# Patient Record
Sex: FEMALE | Race: WHITE | NOT HISPANIC OR LATINO | URBAN - METROPOLITAN AREA
[De-identification: names, ages, dates, MRNs, and addresses within clinical notes are randomized per-mention and may not be internally consistent; named-entity substitution may affect disease eponyms.]

---

## 2017-01-12 ENCOUNTER — OUTPATIENT (OUTPATIENT)
Dept: OUTPATIENT SERVICES | Facility: HOSPITAL | Age: 77
LOS: 1 days | Discharge: HOME | End: 2017-01-12
Payer: COMMERCIAL

## 2017-01-12 DIAGNOSIS — Z95.1 PRESENCE OF AORTOCORONARY BYPASS GRAFT: Chronic | ICD-10-CM

## 2017-01-13 DIAGNOSIS — F31.10 BIPOLAR DISORDER, CURRENT EPISODE MANIC WITHOUT PSYCHOTIC FEATURES, UNSPECIFIED: ICD-10-CM

## 2017-02-02 LAB
ALBUMIN SERPL ELPH-MCNC: 3.6 G/DL — SIGNIFICANT CHANGE UP (ref 3.3–5)
ALP SERPL-CCNC: 47 U/L — SIGNIFICANT CHANGE UP (ref 40–120)
ALT FLD-CCNC: 7 U/L — SIGNIFICANT CHANGE UP (ref 4–33)
AST SERPL-CCNC: 11 U/L — SIGNIFICANT CHANGE UP (ref 4–32)
BASOPHILS # BLD AUTO: 0.13 K/UL — SIGNIFICANT CHANGE UP (ref 0–0.2)
BASOPHILS NFR BLD AUTO: 1.8 % — SIGNIFICANT CHANGE UP (ref 0–2)
BILIRUB SERPL-MCNC: < 0.2 MG/DL — LOW (ref 0.2–1.2)
BUN SERPL-MCNC: 50 MG/DL — HIGH (ref 7–23)
CALCIUM SERPL-MCNC: 9.8 MG/DL — SIGNIFICANT CHANGE UP (ref 8.4–10.5)
CHLORIDE SERPL-SCNC: 105 MMOL/L — SIGNIFICANT CHANGE UP (ref 98–107)
CO2 SERPL-SCNC: 22 MMOL/L — SIGNIFICANT CHANGE UP (ref 22–31)
CREAT SERPL-MCNC: 1.54 MG/DL — HIGH (ref 0.5–1.3)
EOSINOPHIL # BLD AUTO: 0.41 K/UL — SIGNIFICANT CHANGE UP (ref 0–0.5)
EOSINOPHIL NFR BLD AUTO: 5.7 % — SIGNIFICANT CHANGE UP (ref 0–6)
GLUCOSE SERPL-MCNC: 103 MG/DL — HIGH (ref 70–99)
HBA1C BLD-MCNC: 5.3 % — SIGNIFICANT CHANGE UP (ref 4–5.6)
HCT VFR BLD CALC: 28.4 % — LOW (ref 34.5–45)
HGB BLD-MCNC: 9 G/DL — LOW (ref 11.5–15.5)
IMM GRANULOCYTES NFR BLD AUTO: 0.4 % — SIGNIFICANT CHANGE UP (ref 0–1.5)
LYMPHOCYTES # BLD AUTO: 1.63 K/UL — SIGNIFICANT CHANGE UP (ref 1–3.3)
LYMPHOCYTES # BLD AUTO: 22.5 % — SIGNIFICANT CHANGE UP (ref 13–44)
MCHC RBC-ENTMCNC: 30.8 PG — SIGNIFICANT CHANGE UP (ref 27–34)
MCHC RBC-ENTMCNC: 31.7 % — LOW (ref 32–36)
MCV RBC AUTO: 97.3 FL — SIGNIFICANT CHANGE UP (ref 80–100)
MONOCYTES # BLD AUTO: 0.49 K/UL — SIGNIFICANT CHANGE UP (ref 0–0.9)
MONOCYTES NFR BLD AUTO: 6.8 % — SIGNIFICANT CHANGE UP (ref 2–14)
NEUTROPHILS # BLD AUTO: 4.54 K/UL — SIGNIFICANT CHANGE UP (ref 1.8–7.4)
NEUTROPHILS NFR BLD AUTO: 62.8 % — SIGNIFICANT CHANGE UP (ref 43–77)
PLATELET # BLD AUTO: 295 K/UL — SIGNIFICANT CHANGE UP (ref 150–400)
PMV BLD: 11.4 FL — SIGNIFICANT CHANGE UP (ref 7–13)
POTASSIUM SERPL-MCNC: 4.5 MMOL/L — SIGNIFICANT CHANGE UP (ref 3.5–5.3)
POTASSIUM SERPL-SCNC: 4.5 MMOL/L — SIGNIFICANT CHANGE UP (ref 3.5–5.3)
PROT SERPL-MCNC: 6.1 G/DL — SIGNIFICANT CHANGE UP (ref 6–8.3)
RBC # BLD: 2.92 M/UL — LOW (ref 3.8–5.2)
RBC # FLD: 14.1 % — SIGNIFICANT CHANGE UP (ref 10.3–14.5)
SODIUM SERPL-SCNC: 142 MMOL/L — SIGNIFICANT CHANGE UP (ref 135–145)
TSH SERPL-MCNC: 3.36 UIU/ML — SIGNIFICANT CHANGE UP (ref 0.27–4.2)
VALPROATE SERPL-MCNC: 35.7 UG/ML — LOW (ref 50–100)
VIT B12 SERPL-MCNC: 1107 PG/ML — HIGH (ref 200–900)
WBC # BLD: 7.23 K/UL — SIGNIFICANT CHANGE UP (ref 3.8–10.5)
WBC # FLD AUTO: 7.23 K/UL — SIGNIFICANT CHANGE UP (ref 3.8–10.5)

## 2017-06-02 LAB
ALBUMIN SERPL ELPH-MCNC: 3.7 G/DL — SIGNIFICANT CHANGE UP (ref 3.3–5)
ALP SERPL-CCNC: 43 U/L — SIGNIFICANT CHANGE UP (ref 40–120)
ALT FLD-CCNC: 16 U/L — SIGNIFICANT CHANGE UP (ref 4–33)
AST SERPL-CCNC: 21 U/L — SIGNIFICANT CHANGE UP (ref 4–32)
BASOPHILS # BLD AUTO: 0.06 K/UL — SIGNIFICANT CHANGE UP (ref 0–0.2)
BASOPHILS NFR BLD AUTO: 0.8 % — SIGNIFICANT CHANGE UP (ref 0–2)
BILIRUB SERPL-MCNC: 0.2 MG/DL — SIGNIFICANT CHANGE UP (ref 0.2–1.2)
BUN SERPL-MCNC: 54 MG/DL — HIGH (ref 7–23)
CALCIUM SERPL-MCNC: 9.6 MG/DL — SIGNIFICANT CHANGE UP (ref 8.4–10.5)
CHLORIDE SERPL-SCNC: 107 MMOL/L — SIGNIFICANT CHANGE UP (ref 98–107)
CO2 SERPL-SCNC: 25 MMOL/L — SIGNIFICANT CHANGE UP (ref 22–31)
CREAT SERPL-MCNC: 2.03 MG/DL — HIGH (ref 0.5–1.3)
EOSINOPHIL # BLD AUTO: 0.3 K/UL — SIGNIFICANT CHANGE UP (ref 0–0.5)
EOSINOPHIL NFR BLD AUTO: 4.1 % — SIGNIFICANT CHANGE UP (ref 0–6)
GLUCOSE SERPL-MCNC: 168 MG/DL — HIGH (ref 70–99)
HBA1C BLD-MCNC: 5.2 % — SIGNIFICANT CHANGE UP (ref 4–5.6)
HCT VFR BLD CALC: 29.4 % — LOW (ref 34.5–45)
HGB BLD-MCNC: 9.3 G/DL — LOW (ref 11.5–15.5)
IMM GRANULOCYTES NFR BLD AUTO: 0.4 % — SIGNIFICANT CHANGE UP (ref 0–1.5)
LYMPHOCYTES # BLD AUTO: 1.54 K/UL — SIGNIFICANT CHANGE UP (ref 1–3.3)
LYMPHOCYTES # BLD AUTO: 20.9 % — SIGNIFICANT CHANGE UP (ref 13–44)
MCHC RBC-ENTMCNC: 31.6 % — LOW (ref 32–36)
MCHC RBC-ENTMCNC: 31.6 PG — SIGNIFICANT CHANGE UP (ref 27–34)
MCV RBC AUTO: 100 FL — SIGNIFICANT CHANGE UP (ref 80–100)
MONOCYTES # BLD AUTO: 0.45 K/UL — SIGNIFICANT CHANGE UP (ref 0–0.9)
MONOCYTES NFR BLD AUTO: 6.1 % — SIGNIFICANT CHANGE UP (ref 2–14)
NEUTROPHILS # BLD AUTO: 4.99 K/UL — SIGNIFICANT CHANGE UP (ref 1.8–7.4)
NEUTROPHILS NFR BLD AUTO: 67.7 % — SIGNIFICANT CHANGE UP (ref 43–77)
PLATELET # BLD AUTO: 231 K/UL — SIGNIFICANT CHANGE UP (ref 150–400)
PMV BLD: 11.1 FL — SIGNIFICANT CHANGE UP (ref 7–13)
POTASSIUM SERPL-MCNC: 4.9 MMOL/L — SIGNIFICANT CHANGE UP (ref 3.5–5.3)
POTASSIUM SERPL-SCNC: 4.9 MMOL/L — SIGNIFICANT CHANGE UP (ref 3.5–5.3)
PROT SERPL-MCNC: 6.2 G/DL — SIGNIFICANT CHANGE UP (ref 6–8.3)
RBC # BLD: 2.94 M/UL — LOW (ref 3.8–5.2)
RBC # FLD: 14.1 % — SIGNIFICANT CHANGE UP (ref 10.3–14.5)
SODIUM SERPL-SCNC: 146 MMOL/L — HIGH (ref 135–145)
VALPROATE SERPL-MCNC: 56.4 UG/ML — SIGNIFICANT CHANGE UP (ref 50–100)
WBC # BLD: 7.37 K/UL — SIGNIFICANT CHANGE UP (ref 3.8–10.5)
WBC # FLD AUTO: 7.37 K/UL — SIGNIFICANT CHANGE UP (ref 3.8–10.5)

## 2018-04-26 ENCOUNTER — INPATIENT (INPATIENT)
Facility: HOSPITAL | Age: 78
LOS: 53 days | Discharge: NURSING, REHAB OR LONG-TERM CARE | End: 2018-06-19
Attending: PSYCHIATRY & NEUROLOGY | Admitting: PSYCHIATRY & NEUROLOGY
Payer: COMMERCIAL

## 2018-04-26 VITALS — WEIGHT: 177.03 LBS | TEMPERATURE: 98 F | HEIGHT: 68 IN

## 2018-04-26 DIAGNOSIS — Z95.1 PRESENCE OF AORTOCORONARY BYPASS GRAFT: Chronic | ICD-10-CM

## 2018-04-26 DIAGNOSIS — F31.10 BIPOLAR DISORDER, CURRENT EPISODE MANIC WITHOUT PSYCHOTIC FEATURES, UNSPECIFIED: ICD-10-CM

## 2018-04-26 LAB
ALBUMIN SERPL ELPH-MCNC: 3.3 G/DL — SIGNIFICANT CHANGE UP (ref 3.3–5)
ALP SERPL-CCNC: 50 U/L — SIGNIFICANT CHANGE UP (ref 40–120)
ALT FLD-CCNC: 10 U/L — SIGNIFICANT CHANGE UP (ref 4–33)
AST SERPL-CCNC: 26 U/L — SIGNIFICANT CHANGE UP (ref 4–32)
BASOPHILS # BLD AUTO: 0.09 K/UL — SIGNIFICANT CHANGE UP (ref 0–0.2)
BASOPHILS NFR BLD AUTO: 1 % — SIGNIFICANT CHANGE UP (ref 0–2)
BILIRUB SERPL-MCNC: < 0.2 MG/DL — LOW (ref 0.2–1.2)
BUN SERPL-MCNC: 39 MG/DL — HIGH (ref 7–23)
CALCIUM SERPL-MCNC: 10.2 MG/DL — SIGNIFICANT CHANGE UP (ref 8.4–10.5)
CHLORIDE SERPL-SCNC: 103 MMOL/L — SIGNIFICANT CHANGE UP (ref 98–107)
CO2 SERPL-SCNC: 27 MMOL/L — SIGNIFICANT CHANGE UP (ref 22–31)
CREAT SERPL-MCNC: 1.93 MG/DL — HIGH (ref 0.5–1.3)
EOSINOPHIL # BLD AUTO: 0.14 K/UL — SIGNIFICANT CHANGE UP (ref 0–0.5)
EOSINOPHIL NFR BLD AUTO: 1.5 % — SIGNIFICANT CHANGE UP (ref 0–6)
FOLATE SERPL-MCNC: > 20 NG/ML — HIGH (ref 4.7–20)
GLUCOSE SERPL-MCNC: 137 MG/DL — HIGH (ref 70–99)
HBA1C BLD-MCNC: 4.5 % — SIGNIFICANT CHANGE UP (ref 4–5.6)
HCT VFR BLD CALC: 34.1 % — LOW (ref 34.5–45)
HGB BLD-MCNC: 10.6 G/DL — LOW (ref 11.5–15.5)
IMM GRANULOCYTES # BLD AUTO: 0.04 # — SIGNIFICANT CHANGE UP
IMM GRANULOCYTES NFR BLD AUTO: 0.4 % — SIGNIFICANT CHANGE UP (ref 0–1.5)
LYMPHOCYTES # BLD AUTO: 1.26 K/UL — SIGNIFICANT CHANGE UP (ref 1–3.3)
LYMPHOCYTES # BLD AUTO: 13.8 % — SIGNIFICANT CHANGE UP (ref 13–44)
MCHC RBC-ENTMCNC: 31.1 % — LOW (ref 32–36)
MCHC RBC-ENTMCNC: 32.9 PG — SIGNIFICANT CHANGE UP (ref 27–34)
MCV RBC AUTO: 105.9 FL — HIGH (ref 80–100)
MONOCYTES # BLD AUTO: 0.77 K/UL — SIGNIFICANT CHANGE UP (ref 0–0.9)
MONOCYTES NFR BLD AUTO: 8.4 % — SIGNIFICANT CHANGE UP (ref 2–14)
NEUTROPHILS # BLD AUTO: 6.86 K/UL — SIGNIFICANT CHANGE UP (ref 1.8–7.4)
NEUTROPHILS NFR BLD AUTO: 74.9 % — SIGNIFICANT CHANGE UP (ref 43–77)
NRBC # FLD: 0 — SIGNIFICANT CHANGE UP
PLATELET # BLD AUTO: 215 K/UL — SIGNIFICANT CHANGE UP (ref 150–400)
PMV BLD: 12.1 FL — SIGNIFICANT CHANGE UP (ref 7–13)
POTASSIUM SERPL-MCNC: 4.8 MMOL/L — SIGNIFICANT CHANGE UP (ref 3.5–5.3)
POTASSIUM SERPL-SCNC: 4.8 MMOL/L — SIGNIFICANT CHANGE UP (ref 3.5–5.3)
PROT SERPL-MCNC: 6 G/DL — SIGNIFICANT CHANGE UP (ref 6–8.3)
RBC # BLD: 3.22 M/UL — LOW (ref 3.8–5.2)
RBC # FLD: 13.7 % — SIGNIFICANT CHANGE UP (ref 10.3–14.5)
SODIUM SERPL-SCNC: 142 MMOL/L — SIGNIFICANT CHANGE UP (ref 135–145)
TSH SERPL-MCNC: 4.5 UIU/ML — HIGH (ref 0.27–4.2)
VIT B12 SERPL-MCNC: 1848 PG/ML — HIGH (ref 200–900)
WBC # BLD: 9.16 K/UL — SIGNIFICANT CHANGE UP (ref 3.8–10.5)
WBC # FLD AUTO: 9.16 K/UL — SIGNIFICANT CHANGE UP (ref 3.8–10.5)

## 2018-04-26 PROCEDURE — 93010 ELECTROCARDIOGRAM REPORT: CPT

## 2018-04-26 RX ORDER — DIVALPROEX SODIUM 500 MG/1
500 TABLET, DELAYED RELEASE ORAL AT BEDTIME
Qty: 0 | Refills: 0 | Status: DISCONTINUED | OUTPATIENT
Start: 2018-04-26 | End: 2018-05-01

## 2018-04-26 RX ORDER — METOPROLOL TARTRATE 50 MG
100 TABLET ORAL
Qty: 0 | Refills: 0 | Status: DISCONTINUED | OUTPATIENT
Start: 2018-04-26 | End: 2018-05-01

## 2018-04-26 RX ORDER — AMLODIPINE BESYLATE 2.5 MG/1
5 TABLET ORAL DAILY
Qty: 0 | Refills: 0 | Status: DISCONTINUED | OUTPATIENT
Start: 2018-04-26 | End: 2018-05-01

## 2018-04-26 RX ORDER — ATORVASTATIN CALCIUM 80 MG/1
20 TABLET, FILM COATED ORAL AT BEDTIME
Qty: 0 | Refills: 0 | Status: DISCONTINUED | OUTPATIENT
Start: 2018-04-26 | End: 2018-06-19

## 2018-04-26 RX ORDER — HYDRALAZINE HCL 50 MG
50 TABLET ORAL THREE TIMES A DAY
Qty: 0 | Refills: 0 | Status: DISCONTINUED | OUTPATIENT
Start: 2018-04-26 | End: 2018-05-01

## 2018-04-26 RX ORDER — LEVOTHYROXINE SODIUM 125 MCG
75 TABLET ORAL
Qty: 0 | Refills: 0 | Status: DISCONTINUED | OUTPATIENT
Start: 2018-04-26 | End: 2018-06-19

## 2018-04-26 RX ORDER — FUROSEMIDE 40 MG
40 TABLET ORAL DAILY
Qty: 0 | Refills: 0 | Status: DISCONTINUED | OUTPATIENT
Start: 2018-04-26 | End: 2018-05-23

## 2018-04-26 RX ORDER — APIXABAN 2.5 MG/1
5 TABLET, FILM COATED ORAL EVERY 12 HOURS
Qty: 0 | Refills: 0 | Status: DISCONTINUED | OUTPATIENT
Start: 2018-04-26 | End: 2018-05-01

## 2018-04-26 RX ORDER — ESCITALOPRAM OXALATE 10 MG/1
10 TABLET, FILM COATED ORAL DAILY
Qty: 0 | Refills: 0 | Status: DISCONTINUED | OUTPATIENT
Start: 2018-04-26 | End: 2018-06-19

## 2018-04-26 RX ADMIN — DIVALPROEX SODIUM 500 MILLIGRAM(S): 500 TABLET, DELAYED RELEASE ORAL at 22:31

## 2018-04-26 RX ADMIN — ATORVASTATIN CALCIUM 20 MILLIGRAM(S): 80 TABLET, FILM COATED ORAL at 22:31

## 2018-04-26 RX ADMIN — Medication 100 MILLIGRAM(S): at 22:32

## 2018-04-26 RX ADMIN — Medication 50 MILLIGRAM(S): at 22:32

## 2018-04-26 RX ADMIN — APIXABAN 5 MILLIGRAM(S): 2.5 TABLET, FILM COATED ORAL at 22:31

## 2018-04-26 NOTE — CHART NOTE - NSCHARTNOTEFT_GEN_A_CORE
Screening Medical Evaluation  Patient Admitted from: [facility] admitting diagnosis: Bipolar affective disorder, current episode manic without psychotic symptoms      PAST MEDICAL & SURGICAL HISTORY:  Uterine prolapse  Renal insufficiency  Coronary artery disease  Hypothyroid  Hypertension  Bipolar disorder  S/P CABG x 3        Allergies    No Known Allergies    Intolerances    aripiprazole (Unknown)  quetiapine (Unknown)      Social History:     FAMILY HISTORY:  No pertinent family history in first degree relatives      MEDICATIONS  (STANDING):  amLODIPine   Tablet 5 milliGRAM(s) Oral daily  apixaban 5 milliGRAM(s) Oral every 12 hours  atorvastatin 20 milliGRAM(s) Oral at bedtime  diVALproex  milliGRAM(s) Oral at bedtime  escitalopram 10 milliGRAM(s) Oral daily  furosemide    Tablet 40 milliGRAM(s) Oral daily  hydrALAZINE 50 milliGRAM(s) Oral three times a day  levothyroxine 75 MICROGram(s) Oral <User Schedule>  metoprolol tartrate 100 milliGRAM(s) Oral two times a day    MEDICATIONS  (PRN):      Vital Signs Last 24 Hrs  T(C): 36.8 (26 Apr 2018 15:30), Max: 36.8 (26 Apr 2018 15:30)  T(F): 98.3 (26 Apr 2018 15:30), Max: 98.3 (26 Apr 2018 15:30)  HR: 58 (26 Apr 2018 11:13) (58 - 58)  BP: --174/65  BP(mean): --  RR: --  SpO2: --  CAPILLARY BLOOD GLUCOSE            PHYSICAL EXAM:  GENERAL: NAD, well-developed  HEAD:  Atraumatic, Normocephalic  EYES: EOMI, PERRLA, conjunctiva and sclera clear  NECK: Supple, No JVD  CHEST/LUNG: Clear to auscultation bilaterally; No wheeze  HEART: Regular rate and rhythm; No murmurs, rubs, or gallops  ABDOMEN: Soft, Nontender, Nondistended; Bowel sounds present  EXTREMITIES:  2+ Peripheral Pulses, No clubbing, cyanosis, or edema  PSYCH: AAOx3  NEUROLOGY: non-focal  SKIN:    LABS:                        10.6   9.16  )-----------( 215      ( 26 Apr 2018 13:25 )             34.1     04-26    142  |  103  |  39<H>  ----------------------------<  137<H>  4.8   |  27  |  1.93<H>    Ca    10.2      26 Apr 2018 13:25    TPro  6.0  /  Alb  3.3  /  TBili  < 0.2<L>  /  DBili  x   /  AST  26  /  ALT  10  /  AlkPhos  50  04-26              RADIOLOGY & ADDITIONAL TESTS:    Assessment and Plan: 76 yo F with PMH of atrial fibrillation, HTN, hyperlipidemia, hypothyroid is admitted to [facility] with a primary psychiatric diagnosis of Bipolar affective disorder, current episode manic without psychotic symptoms. Pt currently has a low grade fever. The pt currently denies having any medical complaints such as chest pain, sob, abdominal pain, n/v/d/c, or any problems with urination or bowel movements. The rest of her screening physical is unremarkable.     1.Bipolar affective disorder, current episode manic without psychotic symptoms-Plan: continue with meds as per primary psychiatric team  2. HTN-Plan: continue with Amlodipine, furosemide, hydralazine   3. Hyperlipidemia-Plan: continue with atorvastatin   4. Hypothyroid-Plan: continue with levothyroxine  5. A.fib-Plan: continue with apixaben Screening Medical Evaluation  Patient Admitted from: [facility] admitting diagnosis: Bipolar affective disorder, current episode manic without psychotic symptoms      PAST MEDICAL & SURGICAL HISTORY:  Uterine prolapse  Renal insufficiency  Coronary artery disease  Hypothyroid  Hypertension  Bipolar disorder  S/P CABG x 3        Allergies    No Known Allergies    Intolerances    aripiprazole (Unknown)  quetiapine (Unknown)      Social History:     FAMILY HISTORY:  No pertinent family history in first degree relatives      MEDICATIONS  (STANDING):  amLODIPine   Tablet 5 milliGRAM(s) Oral daily  apixaban 5 milliGRAM(s) Oral every 12 hours  atorvastatin 20 milliGRAM(s) Oral at bedtime  diVALproex  milliGRAM(s) Oral at bedtime  escitalopram 10 milliGRAM(s) Oral daily  furosemide    Tablet 40 milliGRAM(s) Oral daily  hydrALAZINE 50 milliGRAM(s) Oral three times a day  levothyroxine 75 MICROGram(s) Oral <User Schedule>  metoprolol tartrate 100 milliGRAM(s) Oral two times a day    MEDICATIONS  (PRN):      Vital Signs Last 24 Hrs  T(C): 36.8 (26 Apr 2018 15:30), Max: 36.8 (26 Apr 2018 15:30)  T(F): 98.3 (26 Apr 2018 15:30), Max: 98.3 (26 Apr 2018 15:30)  HR: 58 (26 Apr 2018 11:13) (58 - 58)  BP: --174/65  BP(mean): --  RR: --  SpO2: --  CAPILLARY BLOOD GLUCOSE            PHYSICAL EXAM:  GENERAL: NAD, well-developed  HEAD:  Atraumatic, Normocephalic  EYES: EOMI, PERRLA, conjunctiva and sclera clear  NECK: Supple, No JVD  CHEST/LUNG: Clear to auscultation bilaterally; No wheeze  HEART: Regular rate and rhythm; No murmurs, rubs, or gallops  ABDOMEN: Soft, Nontender, Nondistended; Bowel sounds present  EXTREMITIES:  2+ Peripheral Pulses, No clubbing, cyanosis, or edema  PSYCH: AAOx3  NEUROLOGY: non-focal  SKIN: In tact    LABS:                        10.6   9.16  )-----------( 215      ( 26 Apr 2018 13:25 )             34.1     04-26    142  |  103  |  39<H>  ----------------------------<  137<H>  4.8   |  27  |  1.93<H>    Ca    10.2      26 Apr 2018 13:25    TPro  6.0  /  Alb  3.3  /  TBili  < 0.2<L>  /  DBili  x   /  AST  26  /  ALT  10  /  AlkPhos  50  04-26              RADIOLOGY & ADDITIONAL TESTS:    Assessment and Plan: 76 yo F with PMH of atrial fibrillation, HTN, hyperlipidemia, hypothyroid is admitted to [facility] with a primary psychiatric diagnosis of Bipolar affective disorder, current episode manic without psychotic symptoms. Pts BP during screening was 183/65, however pt was given BP meds immediately. Repeat BP is Pt currently has a low grade fever. Pt denies any headaches, or blurry vision. The pt currently denies having any medical complaints such as chest pain, sob, abdominal pain, n/v/d/c, or any problems with urination or bowel movements. The rest of her screening physical is unremarkable.     1.Bipolar affective disorder, current episode manic without psychotic symptoms-Plan: continue with meds as per primary psychiatric team  2. HTN-Plan: continue with Amlodipine, furosemide, hydralazine   3. Hyperlipidemia-Plan: continue with atorvastatin   4. Hypothyroid-Plan: continue with levothyroxine  5. A.fib-Plan: continue with apixaban

## 2018-04-27 DIAGNOSIS — I10 ESSENTIAL (PRIMARY) HYPERTENSION: ICD-10-CM

## 2018-04-27 DIAGNOSIS — D64.9 ANEMIA, UNSPECIFIED: ICD-10-CM

## 2018-04-27 DIAGNOSIS — E03.9 HYPOTHYROIDISM, UNSPECIFIED: ICD-10-CM

## 2018-04-27 DIAGNOSIS — E78.5 HYPERLIPIDEMIA, UNSPECIFIED: ICD-10-CM

## 2018-04-27 LAB
CHOLEST SERPL-MCNC: 161 MG/DL — SIGNIFICANT CHANGE UP (ref 120–199)
HDLC SERPL-MCNC: 42 MG/DL — LOW (ref 45–65)
LIPID PNL WITH DIRECT LDL SERPL: 90 MG/DL — SIGNIFICANT CHANGE UP
TRIGL SERPL-MCNC: 170 MG/DL — HIGH (ref 10–149)
VALPROATE SERPL-MCNC: 32.1 UG/ML — LOW (ref 50–100)

## 2018-04-27 PROCEDURE — 99232 SBSQ HOSP IP/OBS MODERATE 35: CPT

## 2018-04-27 RX ADMIN — ESCITALOPRAM OXALATE 10 MILLIGRAM(S): 10 TABLET, FILM COATED ORAL at 08:30

## 2018-04-27 RX ADMIN — Medication 75 MICROGRAM(S): at 06:35

## 2018-04-27 RX ADMIN — Medication 100 MILLIGRAM(S): at 22:46

## 2018-04-27 RX ADMIN — Medication 100 MILLIGRAM(S): at 08:30

## 2018-04-27 RX ADMIN — Medication 50 MILLIGRAM(S): at 12:35

## 2018-04-27 RX ADMIN — Medication 40 MILLIGRAM(S): at 08:30

## 2018-04-27 RX ADMIN — Medication 50 MILLIGRAM(S): at 08:30

## 2018-04-27 RX ADMIN — DIVALPROEX SODIUM 500 MILLIGRAM(S): 500 TABLET, DELAYED RELEASE ORAL at 22:45

## 2018-04-27 RX ADMIN — Medication 50 MILLIGRAM(S): at 22:45

## 2018-04-27 RX ADMIN — APIXABAN 5 MILLIGRAM(S): 2.5 TABLET, FILM COATED ORAL at 22:45

## 2018-04-27 RX ADMIN — APIXABAN 5 MILLIGRAM(S): 2.5 TABLET, FILM COATED ORAL at 08:30

## 2018-04-27 RX ADMIN — ATORVASTATIN CALCIUM 20 MILLIGRAM(S): 80 TABLET, FILM COATED ORAL at 22:45

## 2018-04-27 RX ADMIN — AMLODIPINE BESYLATE 5 MILLIGRAM(S): 2.5 TABLET ORAL at 08:30

## 2018-04-28 PROCEDURE — 99232 SBSQ HOSP IP/OBS MODERATE 35: CPT

## 2018-04-28 RX ADMIN — Medication 50 MILLIGRAM(S): at 09:44

## 2018-04-28 RX ADMIN — ATORVASTATIN CALCIUM 20 MILLIGRAM(S): 80 TABLET, FILM COATED ORAL at 22:39

## 2018-04-28 RX ADMIN — Medication 75 MICROGRAM(S): at 06:25

## 2018-04-28 RX ADMIN — AMLODIPINE BESYLATE 5 MILLIGRAM(S): 2.5 TABLET ORAL at 09:44

## 2018-04-28 RX ADMIN — Medication 100 MILLIGRAM(S): at 22:40

## 2018-04-28 RX ADMIN — APIXABAN 5 MILLIGRAM(S): 2.5 TABLET, FILM COATED ORAL at 22:39

## 2018-04-28 RX ADMIN — DIVALPROEX SODIUM 500 MILLIGRAM(S): 500 TABLET, DELAYED RELEASE ORAL at 22:40

## 2018-04-28 RX ADMIN — APIXABAN 5 MILLIGRAM(S): 2.5 TABLET, FILM COATED ORAL at 09:44

## 2018-04-28 RX ADMIN — Medication 50 MILLIGRAM(S): at 22:40

## 2018-04-28 RX ADMIN — Medication 100 MILLIGRAM(S): at 09:44

## 2018-04-28 RX ADMIN — Medication 40 MILLIGRAM(S): at 09:44

## 2018-04-28 RX ADMIN — ESCITALOPRAM OXALATE 10 MILLIGRAM(S): 10 TABLET, FILM COATED ORAL at 09:44

## 2018-04-28 RX ADMIN — Medication 50 MILLIGRAM(S): at 12:43

## 2018-04-29 PROCEDURE — 99232 SBSQ HOSP IP/OBS MODERATE 35: CPT

## 2018-04-29 RX ADMIN — APIXABAN 5 MILLIGRAM(S): 2.5 TABLET, FILM COATED ORAL at 21:44

## 2018-04-29 RX ADMIN — Medication 40 MILLIGRAM(S): at 09:18

## 2018-04-29 RX ADMIN — DIVALPROEX SODIUM 500 MILLIGRAM(S): 500 TABLET, DELAYED RELEASE ORAL at 21:44

## 2018-04-29 RX ADMIN — APIXABAN 5 MILLIGRAM(S): 2.5 TABLET, FILM COATED ORAL at 09:18

## 2018-04-29 RX ADMIN — AMLODIPINE BESYLATE 5 MILLIGRAM(S): 2.5 TABLET ORAL at 09:18

## 2018-04-29 RX ADMIN — Medication 100 MILLIGRAM(S): at 09:18

## 2018-04-29 RX ADMIN — ESCITALOPRAM OXALATE 10 MILLIGRAM(S): 10 TABLET, FILM COATED ORAL at 09:18

## 2018-04-29 RX ADMIN — ATORVASTATIN CALCIUM 20 MILLIGRAM(S): 80 TABLET, FILM COATED ORAL at 21:44

## 2018-04-29 RX ADMIN — Medication 50 MILLIGRAM(S): at 12:38

## 2018-04-29 RX ADMIN — Medication 100 MILLIGRAM(S): at 21:44

## 2018-04-29 RX ADMIN — Medication 50 MILLIGRAM(S): at 09:18

## 2018-04-29 RX ADMIN — Medication 50 MILLIGRAM(S): at 21:44

## 2018-04-29 RX ADMIN — Medication 75 MICROGRAM(S): at 06:44

## 2018-04-30 LAB
BUN SERPL-MCNC: 46 MG/DL — HIGH (ref 7–23)
CALCIUM SERPL-MCNC: 9.5 MG/DL — SIGNIFICANT CHANGE UP (ref 8.4–10.5)
CHLORIDE SERPL-SCNC: 106 MMOL/L — SIGNIFICANT CHANGE UP (ref 98–107)
CO2 SERPL-SCNC: 26 MMOL/L — SIGNIFICANT CHANGE UP (ref 22–31)
CREAT SERPL-MCNC: 1.97 MG/DL — HIGH (ref 0.5–1.3)
GLUCOSE SERPL-MCNC: 93 MG/DL — SIGNIFICANT CHANGE UP (ref 70–99)
POTASSIUM SERPL-MCNC: 3.9 MMOL/L — SIGNIFICANT CHANGE UP (ref 3.5–5.3)
POTASSIUM SERPL-SCNC: 3.9 MMOL/L — SIGNIFICANT CHANGE UP (ref 3.5–5.3)
SODIUM SERPL-SCNC: 147 MMOL/L — HIGH (ref 135–145)

## 2018-04-30 PROCEDURE — 99223 1ST HOSP IP/OBS HIGH 75: CPT

## 2018-04-30 PROCEDURE — 99232 SBSQ HOSP IP/OBS MODERATE 35: CPT

## 2018-04-30 RX ADMIN — ESCITALOPRAM OXALATE 10 MILLIGRAM(S): 10 TABLET, FILM COATED ORAL at 09:04

## 2018-04-30 RX ADMIN — Medication 40 MILLIGRAM(S): at 09:05

## 2018-04-30 RX ADMIN — DIVALPROEX SODIUM 500 MILLIGRAM(S): 500 TABLET, DELAYED RELEASE ORAL at 21:17

## 2018-04-30 RX ADMIN — ATORVASTATIN CALCIUM 20 MILLIGRAM(S): 80 TABLET, FILM COATED ORAL at 21:17

## 2018-04-30 RX ADMIN — APIXABAN 5 MILLIGRAM(S): 2.5 TABLET, FILM COATED ORAL at 21:17

## 2018-04-30 RX ADMIN — Medication 50 MILLIGRAM(S): at 14:01

## 2018-04-30 RX ADMIN — Medication 100 MILLIGRAM(S): at 09:05

## 2018-04-30 RX ADMIN — Medication 50 MILLIGRAM(S): at 21:17

## 2018-04-30 RX ADMIN — Medication 50 MILLIGRAM(S): at 09:05

## 2018-04-30 RX ADMIN — APIXABAN 5 MILLIGRAM(S): 2.5 TABLET, FILM COATED ORAL at 09:04

## 2018-04-30 RX ADMIN — AMLODIPINE BESYLATE 5 MILLIGRAM(S): 2.5 TABLET ORAL at 09:04

## 2018-04-30 RX ADMIN — Medication 75 MICROGRAM(S): at 06:29

## 2018-04-30 RX ADMIN — Medication 100 MILLIGRAM(S): at 21:17

## 2018-04-30 NOTE — CONSULT NOTE ADULT - SUBJECTIVE AND OBJECTIVE BOX
HPI: Pt is 77F admitted to [facility] 4/26/18 for management of depression, ECT is planned.  SInce patient had a CVA in 2016 she has been progressively more depressed and less mobile.  She was able to ambulate with a walker and one person assist after the CVA.  She was evaluated by her cardiologisty Dr Llamas in Sept 2017 (see below).  In November 2017 she was hospitalized for a UTI then spent one month in a subacute rehab.  She is currently wheelchair-dependent.  She is alert and oriented but unable to provide medical history.  She denies CP/dyspnea/paliptations/headaches.    PAST MEDICAL & SURGICAL HISTORY:  Chronic diastolic congestive heart failure: TTE 9/18/17 Dr Llamas/Adela: normal LVEF 50-55%, grade 1 diastolic dysfunction, borderline LVH  CVA (cerebral vascular accident): 1987, 2016  Paroxysmal atrial fibrillation: questionable history after CVA 2016; started on eliquis  Stage 4 chronic kidney disease  Uterine prolapse: pessary 2015  Coronary artery disease: CABG 2003  Hypothyroid  Hypertension  Bipolar disorder  S/P CABG x 3: 2003      Review of Systems:   CONSTITUTIONAL: No fever, weight loss, or fatigue  EYES: No eye pain, visual disturbances, or discharge  ENMT:  No difficulty hearing, tinnitus, vertigo; No sinus or throat pain  NECK: No pain or stiffness  RESPIRATORY: No cough, wheezing, chills or hemoptysis; No shortness of breath  CARDIOVASCULAR: No chest pain, palpitations, dizziness, or leg swelling  GASTROINTESTINAL: No abdominal or epigastric pain. No nausea, vomiting, or hematemesis; No diarrhea or constipation. No melena or hematochezia.  GENITOURINARY: No dysuria, frequency, hematuria, or incontinence  NEUROLOGICAL: No headaches, numbness, or tremors.  Unable to ambulate.  SKIN: No itching, burning, rashes, or lesions   LYMPH NODES: No enlarged glands  ENDOCRINE: No heat or cold intolerance; No hair loss  MUSCULOSKELETAL: No joint pain or swelling; No muscle, back, or extremity pain  HEME/LYMPH: No easy bruising, or bleeding gums  ALLERY AND IMMUNOLOGIC: No hives or eczema    Allergies    No Known Allergies    Intolerances    aripiprazole (Unknown)  quetiapine (Unknown)      Social History: never smoker, no IDU/etoh    FAMILY HISTORY:  No pertinent family history in first degree relatives      MEDICATIONS  (STANDING):  amLODIPine   Tablet 5 milliGRAM(s) Oral daily  apixaban 5 milliGRAM(s) Oral every 12 hours  atorvastatin 20 milliGRAM(s) Oral at bedtime  diVALproex  milliGRAM(s) Oral at bedtime  escitalopram 10 milliGRAM(s) Oral daily  furosemide    Tablet 40 milliGRAM(s) Oral daily  hydrALAZINE 50 milliGRAM(s) Oral three times a day  levothyroxine 75 MICROGram(s) Oral <User Schedule>  metoprolol tartrate 100 milliGRAM(s) Oral two times a day    MEDICATIONS  (PRN):      Vital Signs Last 24 Hrs  T(C): 36.8 (30 Apr 2018 08:06), Max: 36.9 (29 Apr 2018 15:35)  T(F): 98.3 (30 Apr 2018 08:06), Max: 98.4 (29 Apr 2018 15:35)  HR: 64 (30 Apr 2018 09:54) (64 - 64)  BP: 143/70  BP(mean): --  RR: 17 (29 Apr 2018 20:32) (17 - 17)  SpO2: --  CAPILLARY BLOOD GLUCOSE            PHYSICAL EXAM:  GENERAL: NAD, well-developed  HEAD:  Atraumatic, Normocephalic  EYES: EOMI, PERRLA, conjunctiva and sclera clear  NECK: Supple, No JVD  CHEST/LUNG: Clear to auscultation bilaterally; No wheeze  HEART: Regular rate and rhythm; No murmurs, rubs, or gallops  ABDOMEN: Soft, Nontender, Nondistended; Bowel sounds present  EXTREMITIES:  2+ Peripheral Pulses, No clubbing, cyanosis, or edema  PSYCH: AAOx3  NEUROLOGY: LE weakness  SKIN: No rashes or lesions    LABS:    04-30    147<H>  |  106  |  46<H>  ----------------------------<  93  3.9   |  26  |  1.97<H>    Ca    9.5      30 Apr 2018 08:48        EKG(personally reviewed): SB 51 Qs in III, F, no change vs prior EKG    RADIOLOGY & ADDITIONAL TESTS:    TTE 9/18/17: normal LVEF 50-55%, grade 1 diastolic dysfunction, borderline LVH    Consultant(s) Notes Reviewed:  Cardiology Dr Llamas 9/18/17    Care Discussed with Consultants/Other Providers: Dr Amador

## 2018-04-30 NOTE — CONSULT NOTE ADULT - ASSESSMENT
77F with depression, ECT is planned. Pre-procedure evaluation:   ECT specific risk assessment: pt without history of increased ICP, aneurysm, active pulmonary disease, valvular disease, CAD, cerebral vascular disease.     Cardiovascular risk assessment: The patient's CAD has been stably medically managed since CABG and her mild diastolic CHF is stable, she appears euvolemic.  She is at low risk for cardiovascular complications from the low risk procedure, ECT. She is medically optimized to proceed with ECT without further testing.    Anesthesia specific concerns :    History of adverse reaction to anesthesia previously: none  Esophageal Assessment: normal  Known Spine issues: none   CKD stage 4, stable    Further risk reduction will involve medical management of other comorbid conditions:     CAD: continue statin as per Dr Llamas    PAfib: In NSR, remains on AC with renally-dosed eliquis    HTN: Continue amlodipine, metoprolol, lasix, hydralazine.      Chronic diastolic CHF: euvolemic, continue lasix    CKD4: stable, avoid nephrotoxins    Hypothyroidism: Continue synthroid 75. Borderline TSH, repeat.     Hx CVA: Physical therapy to maximize mobility

## 2018-05-01 PROCEDURE — 99232 SBSQ HOSP IP/OBS MODERATE 35: CPT

## 2018-05-01 RX ORDER — HYDRALAZINE HCL 50 MG
50 TABLET ORAL
Qty: 0 | Refills: 0 | Status: DISCONTINUED | OUTPATIENT
Start: 2018-05-01 | End: 2018-06-19

## 2018-05-01 RX ORDER — APIXABAN 2.5 MG/1
5 TABLET, FILM COATED ORAL
Qty: 0 | Refills: 0 | Status: DISCONTINUED | OUTPATIENT
Start: 2018-05-01 | End: 2018-06-19

## 2018-05-01 RX ORDER — METOPROLOL TARTRATE 50 MG
100 TABLET ORAL
Qty: 0 | Refills: 0 | Status: DISCONTINUED | OUTPATIENT
Start: 2018-05-01 | End: 2018-06-19

## 2018-05-01 RX ORDER — AMLODIPINE BESYLATE 2.5 MG/1
5 TABLET ORAL
Qty: 0 | Refills: 0 | Status: DISCONTINUED | OUTPATIENT
Start: 2018-05-02 | End: 2018-06-11

## 2018-05-01 RX ADMIN — ATORVASTATIN CALCIUM 20 MILLIGRAM(S): 80 TABLET, FILM COATED ORAL at 21:57

## 2018-05-01 RX ADMIN — ESCITALOPRAM OXALATE 10 MILLIGRAM(S): 10 TABLET, FILM COATED ORAL at 08:55

## 2018-05-01 RX ADMIN — Medication 100 MILLIGRAM(S): at 08:56

## 2018-05-01 RX ADMIN — Medication 100 MILLIGRAM(S): at 21:57

## 2018-05-01 RX ADMIN — Medication 50 MILLIGRAM(S): at 21:57

## 2018-05-01 RX ADMIN — APIXABAN 5 MILLIGRAM(S): 2.5 TABLET, FILM COATED ORAL at 08:55

## 2018-05-01 RX ADMIN — Medication 50 MILLIGRAM(S): at 08:56

## 2018-05-01 RX ADMIN — AMLODIPINE BESYLATE 5 MILLIGRAM(S): 2.5 TABLET ORAL at 08:55

## 2018-05-01 RX ADMIN — Medication 40 MILLIGRAM(S): at 08:55

## 2018-05-01 RX ADMIN — APIXABAN 5 MILLIGRAM(S): 2.5 TABLET, FILM COATED ORAL at 21:57

## 2018-05-01 RX ADMIN — Medication 50 MILLIGRAM(S): at 14:02

## 2018-05-01 RX ADMIN — Medication 75 MICROGRAM(S): at 07:27

## 2018-05-02 PROCEDURE — 90870 ELECTROCONVULSIVE THERAPY: CPT

## 2018-05-02 PROCEDURE — 99232 SBSQ HOSP IP/OBS MODERATE 35: CPT | Mod: 25

## 2018-05-02 RX ADMIN — Medication 75 MICROGRAM(S): at 06:30

## 2018-05-02 RX ADMIN — Medication 100 MILLIGRAM(S): at 21:57

## 2018-05-02 RX ADMIN — AMLODIPINE BESYLATE 5 MILLIGRAM(S): 2.5 TABLET ORAL at 06:30

## 2018-05-02 RX ADMIN — Medication 50 MILLIGRAM(S): at 06:30

## 2018-05-02 RX ADMIN — APIXABAN 5 MILLIGRAM(S): 2.5 TABLET, FILM COATED ORAL at 06:30

## 2018-05-02 RX ADMIN — ESCITALOPRAM OXALATE 10 MILLIGRAM(S): 10 TABLET, FILM COATED ORAL at 14:10

## 2018-05-02 RX ADMIN — Medication 100 MILLIGRAM(S): at 06:30

## 2018-05-02 RX ADMIN — APIXABAN 5 MILLIGRAM(S): 2.5 TABLET, FILM COATED ORAL at 21:57

## 2018-05-02 RX ADMIN — Medication 50 MILLIGRAM(S): at 14:10

## 2018-05-02 RX ADMIN — ATORVASTATIN CALCIUM 20 MILLIGRAM(S): 80 TABLET, FILM COATED ORAL at 21:57

## 2018-05-02 RX ADMIN — Medication 40 MILLIGRAM(S): at 14:10

## 2018-05-02 RX ADMIN — Medication 50 MILLIGRAM(S): at 21:57

## 2018-05-03 PROCEDURE — 99232 SBSQ HOSP IP/OBS MODERATE 35: CPT

## 2018-05-03 RX ADMIN — APIXABAN 5 MILLIGRAM(S): 2.5 TABLET, FILM COATED ORAL at 22:09

## 2018-05-03 RX ADMIN — AMLODIPINE BESYLATE 5 MILLIGRAM(S): 2.5 TABLET ORAL at 06:45

## 2018-05-03 RX ADMIN — Medication 75 MICROGRAM(S): at 06:45

## 2018-05-03 RX ADMIN — ATORVASTATIN CALCIUM 20 MILLIGRAM(S): 80 TABLET, FILM COATED ORAL at 22:09

## 2018-05-03 RX ADMIN — ESCITALOPRAM OXALATE 10 MILLIGRAM(S): 10 TABLET, FILM COATED ORAL at 10:17

## 2018-05-03 RX ADMIN — APIXABAN 5 MILLIGRAM(S): 2.5 TABLET, FILM COATED ORAL at 06:45

## 2018-05-03 RX ADMIN — Medication 40 MILLIGRAM(S): at 10:17

## 2018-05-03 RX ADMIN — Medication 50 MILLIGRAM(S): at 22:09

## 2018-05-03 RX ADMIN — Medication 100 MILLIGRAM(S): at 06:45

## 2018-05-03 RX ADMIN — Medication 50 MILLIGRAM(S): at 06:45

## 2018-05-03 RX ADMIN — Medication 50 MILLIGRAM(S): at 12:56

## 2018-05-03 RX ADMIN — Medication 100 MILLIGRAM(S): at 22:09

## 2018-05-04 PROCEDURE — 90870 ELECTROCONVULSIVE THERAPY: CPT

## 2018-05-04 PROCEDURE — 99232 SBSQ HOSP IP/OBS MODERATE 35: CPT | Mod: 25

## 2018-05-04 RX ADMIN — Medication 50 MILLIGRAM(S): at 21:59

## 2018-05-04 RX ADMIN — Medication 100 MILLIGRAM(S): at 21:59

## 2018-05-04 RX ADMIN — ESCITALOPRAM OXALATE 10 MILLIGRAM(S): 10 TABLET, FILM COATED ORAL at 11:03

## 2018-05-04 RX ADMIN — Medication 75 MICROGRAM(S): at 06:53

## 2018-05-04 RX ADMIN — APIXABAN 5 MILLIGRAM(S): 2.5 TABLET, FILM COATED ORAL at 21:59

## 2018-05-04 RX ADMIN — AMLODIPINE BESYLATE 5 MILLIGRAM(S): 2.5 TABLET ORAL at 06:53

## 2018-05-04 RX ADMIN — APIXABAN 5 MILLIGRAM(S): 2.5 TABLET, FILM COATED ORAL at 06:52

## 2018-05-04 RX ADMIN — Medication 100 MILLIGRAM(S): at 06:53

## 2018-05-04 RX ADMIN — ATORVASTATIN CALCIUM 20 MILLIGRAM(S): 80 TABLET, FILM COATED ORAL at 21:59

## 2018-05-04 RX ADMIN — Medication 50 MILLIGRAM(S): at 06:52

## 2018-05-04 RX ADMIN — Medication 50 MILLIGRAM(S): at 12:29

## 2018-05-04 RX ADMIN — Medication 40 MILLIGRAM(S): at 11:03

## 2018-05-05 PROCEDURE — 99232 SBSQ HOSP IP/OBS MODERATE 35: CPT

## 2018-05-05 RX ADMIN — Medication 50 MILLIGRAM(S): at 13:15

## 2018-05-05 RX ADMIN — Medication 40 MILLIGRAM(S): at 10:58

## 2018-05-05 RX ADMIN — ESCITALOPRAM OXALATE 10 MILLIGRAM(S): 10 TABLET, FILM COATED ORAL at 10:58

## 2018-05-05 RX ADMIN — Medication 100 MILLIGRAM(S): at 21:25

## 2018-05-05 RX ADMIN — Medication 100 MILLIGRAM(S): at 07:14

## 2018-05-05 RX ADMIN — AMLODIPINE BESYLATE 5 MILLIGRAM(S): 2.5 TABLET ORAL at 07:13

## 2018-05-05 RX ADMIN — Medication 75 MICROGRAM(S): at 06:34

## 2018-05-05 RX ADMIN — Medication 50 MILLIGRAM(S): at 07:14

## 2018-05-05 RX ADMIN — APIXABAN 5 MILLIGRAM(S): 2.5 TABLET, FILM COATED ORAL at 21:24

## 2018-05-05 RX ADMIN — ATORVASTATIN CALCIUM 20 MILLIGRAM(S): 80 TABLET, FILM COATED ORAL at 21:25

## 2018-05-05 RX ADMIN — Medication 50 MILLIGRAM(S): at 21:25

## 2018-05-05 RX ADMIN — APIXABAN 5 MILLIGRAM(S): 2.5 TABLET, FILM COATED ORAL at 07:13

## 2018-05-06 PROCEDURE — 99232 SBSQ HOSP IP/OBS MODERATE 35: CPT

## 2018-05-06 RX ADMIN — Medication 50 MILLIGRAM(S): at 22:46

## 2018-05-06 RX ADMIN — Medication 50 MILLIGRAM(S): at 07:13

## 2018-05-06 RX ADMIN — Medication 100 MILLIGRAM(S): at 22:46

## 2018-05-06 RX ADMIN — ESCITALOPRAM OXALATE 10 MILLIGRAM(S): 10 TABLET, FILM COATED ORAL at 09:13

## 2018-05-06 RX ADMIN — Medication 40 MILLIGRAM(S): at 09:13

## 2018-05-06 RX ADMIN — APIXABAN 5 MILLIGRAM(S): 2.5 TABLET, FILM COATED ORAL at 22:45

## 2018-05-06 RX ADMIN — Medication 100 MILLIGRAM(S): at 07:13

## 2018-05-06 RX ADMIN — AMLODIPINE BESYLATE 5 MILLIGRAM(S): 2.5 TABLET ORAL at 07:12

## 2018-05-06 RX ADMIN — Medication 75 MICROGRAM(S): at 07:13

## 2018-05-06 RX ADMIN — Medication 50 MILLIGRAM(S): at 12:12

## 2018-05-06 RX ADMIN — APIXABAN 5 MILLIGRAM(S): 2.5 TABLET, FILM COATED ORAL at 07:12

## 2018-05-06 RX ADMIN — ATORVASTATIN CALCIUM 20 MILLIGRAM(S): 80 TABLET, FILM COATED ORAL at 22:45

## 2018-05-07 PROCEDURE — 99232 SBSQ HOSP IP/OBS MODERATE 35: CPT | Mod: 25

## 2018-05-07 PROCEDURE — 90870 ELECTROCONVULSIVE THERAPY: CPT

## 2018-05-07 RX ADMIN — Medication 75 MICROGRAM(S): at 07:57

## 2018-05-07 RX ADMIN — Medication 40 MILLIGRAM(S): at 12:14

## 2018-05-07 RX ADMIN — APIXABAN 5 MILLIGRAM(S): 2.5 TABLET, FILM COATED ORAL at 07:57

## 2018-05-07 RX ADMIN — APIXABAN 5 MILLIGRAM(S): 2.5 TABLET, FILM COATED ORAL at 21:55

## 2018-05-07 RX ADMIN — Medication 50 MILLIGRAM(S): at 12:15

## 2018-05-07 RX ADMIN — Medication 100 MILLIGRAM(S): at 12:15

## 2018-05-07 RX ADMIN — AMLODIPINE BESYLATE 5 MILLIGRAM(S): 2.5 TABLET ORAL at 06:51

## 2018-05-07 RX ADMIN — ESCITALOPRAM OXALATE 10 MILLIGRAM(S): 10 TABLET, FILM COATED ORAL at 12:14

## 2018-05-07 RX ADMIN — Medication 100 MILLIGRAM(S): at 21:55

## 2018-05-07 RX ADMIN — Medication 50 MILLIGRAM(S): at 21:55

## 2018-05-07 RX ADMIN — ATORVASTATIN CALCIUM 20 MILLIGRAM(S): 80 TABLET, FILM COATED ORAL at 21:55

## 2018-05-08 PROCEDURE — 99232 SBSQ HOSP IP/OBS MODERATE 35: CPT

## 2018-05-08 RX ADMIN — ATORVASTATIN CALCIUM 20 MILLIGRAM(S): 80 TABLET, FILM COATED ORAL at 22:18

## 2018-05-08 RX ADMIN — Medication 40 MILLIGRAM(S): at 10:02

## 2018-05-08 RX ADMIN — Medication 100 MILLIGRAM(S): at 22:18

## 2018-05-08 RX ADMIN — APIXABAN 5 MILLIGRAM(S): 2.5 TABLET, FILM COATED ORAL at 22:18

## 2018-05-08 RX ADMIN — AMLODIPINE BESYLATE 5 MILLIGRAM(S): 2.5 TABLET ORAL at 06:00

## 2018-05-08 RX ADMIN — Medication 50 MILLIGRAM(S): at 13:07

## 2018-05-08 RX ADMIN — APIXABAN 5 MILLIGRAM(S): 2.5 TABLET, FILM COATED ORAL at 07:17

## 2018-05-08 RX ADMIN — Medication 100 MILLIGRAM(S): at 07:17

## 2018-05-08 RX ADMIN — Medication 75 MICROGRAM(S): at 07:17

## 2018-05-08 RX ADMIN — ESCITALOPRAM OXALATE 10 MILLIGRAM(S): 10 TABLET, FILM COATED ORAL at 10:02

## 2018-05-08 RX ADMIN — Medication 50 MILLIGRAM(S): at 07:17

## 2018-05-08 RX ADMIN — Medication 50 MILLIGRAM(S): at 22:18

## 2018-05-09 PROCEDURE — 99232 SBSQ HOSP IP/OBS MODERATE 35: CPT | Mod: 25

## 2018-05-09 PROCEDURE — 90870 ELECTROCONVULSIVE THERAPY: CPT

## 2018-05-09 RX ADMIN — Medication 40 MILLIGRAM(S): at 13:08

## 2018-05-09 RX ADMIN — Medication 100 MILLIGRAM(S): at 21:14

## 2018-05-09 RX ADMIN — Medication 50 MILLIGRAM(S): at 06:37

## 2018-05-09 RX ADMIN — ATORVASTATIN CALCIUM 20 MILLIGRAM(S): 80 TABLET, FILM COATED ORAL at 21:14

## 2018-05-09 RX ADMIN — APIXABAN 5 MILLIGRAM(S): 2.5 TABLET, FILM COATED ORAL at 06:36

## 2018-05-09 RX ADMIN — Medication 50 MILLIGRAM(S): at 13:08

## 2018-05-09 RX ADMIN — APIXABAN 5 MILLIGRAM(S): 2.5 TABLET, FILM COATED ORAL at 21:14

## 2018-05-09 RX ADMIN — AMLODIPINE BESYLATE 5 MILLIGRAM(S): 2.5 TABLET ORAL at 06:36

## 2018-05-09 RX ADMIN — Medication 100 MILLIGRAM(S): at 06:37

## 2018-05-09 RX ADMIN — Medication 75 MICROGRAM(S): at 06:37

## 2018-05-09 RX ADMIN — ESCITALOPRAM OXALATE 10 MILLIGRAM(S): 10 TABLET, FILM COATED ORAL at 13:08

## 2018-05-09 RX ADMIN — Medication 50 MILLIGRAM(S): at 21:14

## 2018-05-10 PROCEDURE — 99232 SBSQ HOSP IP/OBS MODERATE 35: CPT

## 2018-05-10 RX ADMIN — AMLODIPINE BESYLATE 5 MILLIGRAM(S): 2.5 TABLET ORAL at 06:35

## 2018-05-10 RX ADMIN — APIXABAN 5 MILLIGRAM(S): 2.5 TABLET, FILM COATED ORAL at 21:53

## 2018-05-10 RX ADMIN — Medication 40 MILLIGRAM(S): at 08:46

## 2018-05-10 RX ADMIN — Medication 100 MILLIGRAM(S): at 06:35

## 2018-05-10 RX ADMIN — Medication 50 MILLIGRAM(S): at 21:53

## 2018-05-10 RX ADMIN — Medication 50 MILLIGRAM(S): at 12:37

## 2018-05-10 RX ADMIN — ATORVASTATIN CALCIUM 20 MILLIGRAM(S): 80 TABLET, FILM COATED ORAL at 21:53

## 2018-05-10 RX ADMIN — ESCITALOPRAM OXALATE 10 MILLIGRAM(S): 10 TABLET, FILM COATED ORAL at 08:46

## 2018-05-10 RX ADMIN — Medication 50 MILLIGRAM(S): at 06:35

## 2018-05-10 RX ADMIN — Medication 75 MICROGRAM(S): at 06:35

## 2018-05-10 RX ADMIN — Medication 100 MILLIGRAM(S): at 21:53

## 2018-05-10 RX ADMIN — APIXABAN 5 MILLIGRAM(S): 2.5 TABLET, FILM COATED ORAL at 06:35

## 2018-05-11 PROCEDURE — 99232 SBSQ HOSP IP/OBS MODERATE 35: CPT | Mod: 25

## 2018-05-11 PROCEDURE — 90870 ELECTROCONVULSIVE THERAPY: CPT

## 2018-05-11 RX ADMIN — AMLODIPINE BESYLATE 5 MILLIGRAM(S): 2.5 TABLET ORAL at 06:59

## 2018-05-11 RX ADMIN — Medication 40 MILLIGRAM(S): at 10:49

## 2018-05-11 RX ADMIN — ESCITALOPRAM OXALATE 10 MILLIGRAM(S): 10 TABLET, FILM COATED ORAL at 10:49

## 2018-05-11 RX ADMIN — Medication 50 MILLIGRAM(S): at 06:59

## 2018-05-11 RX ADMIN — Medication 50 MILLIGRAM(S): at 12:55

## 2018-05-11 RX ADMIN — Medication 50 MILLIGRAM(S): at 22:47

## 2018-05-11 RX ADMIN — Medication 100 MILLIGRAM(S): at 06:59

## 2018-05-11 RX ADMIN — ATORVASTATIN CALCIUM 20 MILLIGRAM(S): 80 TABLET, FILM COATED ORAL at 22:47

## 2018-05-11 RX ADMIN — APIXABAN 5 MILLIGRAM(S): 2.5 TABLET, FILM COATED ORAL at 22:47

## 2018-05-11 RX ADMIN — Medication 100 MILLIGRAM(S): at 22:47

## 2018-05-11 RX ADMIN — APIXABAN 5 MILLIGRAM(S): 2.5 TABLET, FILM COATED ORAL at 06:59

## 2018-05-11 RX ADMIN — Medication 75 MICROGRAM(S): at 06:59

## 2018-05-12 RX ADMIN — APIXABAN 5 MILLIGRAM(S): 2.5 TABLET, FILM COATED ORAL at 23:24

## 2018-05-12 RX ADMIN — Medication 50 MILLIGRAM(S): at 12:40

## 2018-05-12 RX ADMIN — ATORVASTATIN CALCIUM 20 MILLIGRAM(S): 80 TABLET, FILM COATED ORAL at 23:26

## 2018-05-12 RX ADMIN — AMLODIPINE BESYLATE 5 MILLIGRAM(S): 2.5 TABLET ORAL at 06:57

## 2018-05-12 RX ADMIN — Medication 75 MICROGRAM(S): at 06:58

## 2018-05-12 RX ADMIN — ESCITALOPRAM OXALATE 10 MILLIGRAM(S): 10 TABLET, FILM COATED ORAL at 09:16

## 2018-05-12 RX ADMIN — Medication 50 MILLIGRAM(S): at 06:57

## 2018-05-12 RX ADMIN — Medication 40 MILLIGRAM(S): at 09:16

## 2018-05-12 RX ADMIN — Medication 100 MILLIGRAM(S): at 06:58

## 2018-05-12 RX ADMIN — APIXABAN 5 MILLIGRAM(S): 2.5 TABLET, FILM COATED ORAL at 06:57

## 2018-05-12 RX ADMIN — Medication 100 MILLIGRAM(S): at 23:26

## 2018-05-12 RX ADMIN — Medication 50 MILLIGRAM(S): at 23:26

## 2018-05-13 RX ADMIN — ESCITALOPRAM OXALATE 10 MILLIGRAM(S): 10 TABLET, FILM COATED ORAL at 08:24

## 2018-05-13 RX ADMIN — Medication 100 MILLIGRAM(S): at 07:05

## 2018-05-13 RX ADMIN — Medication 40 MILLIGRAM(S): at 08:25

## 2018-05-13 RX ADMIN — Medication 50 MILLIGRAM(S): at 21:43

## 2018-05-13 RX ADMIN — ATORVASTATIN CALCIUM 20 MILLIGRAM(S): 80 TABLET, FILM COATED ORAL at 21:43

## 2018-05-13 RX ADMIN — Medication 50 MILLIGRAM(S): at 07:05

## 2018-05-13 RX ADMIN — APIXABAN 5 MILLIGRAM(S): 2.5 TABLET, FILM COATED ORAL at 07:05

## 2018-05-13 RX ADMIN — APIXABAN 5 MILLIGRAM(S): 2.5 TABLET, FILM COATED ORAL at 21:43

## 2018-05-13 RX ADMIN — Medication 75 MICROGRAM(S): at 07:05

## 2018-05-13 RX ADMIN — AMLODIPINE BESYLATE 5 MILLIGRAM(S): 2.5 TABLET ORAL at 07:05

## 2018-05-13 RX ADMIN — Medication 50 MILLIGRAM(S): at 12:46

## 2018-05-13 RX ADMIN — Medication 100 MILLIGRAM(S): at 21:43

## 2018-05-14 PROCEDURE — 90870 ELECTROCONVULSIVE THERAPY: CPT

## 2018-05-14 PROCEDURE — 99232 SBSQ HOSP IP/OBS MODERATE 35: CPT | Mod: 25

## 2018-05-14 RX ADMIN — Medication 100 MILLIGRAM(S): at 21:51

## 2018-05-14 RX ADMIN — AMLODIPINE BESYLATE 5 MILLIGRAM(S): 2.5 TABLET ORAL at 06:57

## 2018-05-14 RX ADMIN — ATORVASTATIN CALCIUM 20 MILLIGRAM(S): 80 TABLET, FILM COATED ORAL at 21:51

## 2018-05-14 RX ADMIN — APIXABAN 5 MILLIGRAM(S): 2.5 TABLET, FILM COATED ORAL at 06:57

## 2018-05-14 RX ADMIN — Medication 100 MILLIGRAM(S): at 06:57

## 2018-05-14 RX ADMIN — Medication 50 MILLIGRAM(S): at 12:56

## 2018-05-14 RX ADMIN — Medication 40 MILLIGRAM(S): at 10:25

## 2018-05-14 RX ADMIN — ESCITALOPRAM OXALATE 10 MILLIGRAM(S): 10 TABLET, FILM COATED ORAL at 10:25

## 2018-05-14 RX ADMIN — APIXABAN 5 MILLIGRAM(S): 2.5 TABLET, FILM COATED ORAL at 21:51

## 2018-05-14 RX ADMIN — Medication 50 MILLIGRAM(S): at 21:51

## 2018-05-14 RX ADMIN — Medication 50 MILLIGRAM(S): at 06:57

## 2018-05-14 RX ADMIN — Medication 75 MICROGRAM(S): at 06:57

## 2018-05-15 PROCEDURE — 90853 GROUP PSYCHOTHERAPY: CPT

## 2018-05-15 PROCEDURE — 99232 SBSQ HOSP IP/OBS MODERATE 35: CPT | Mod: 25

## 2018-05-15 RX ADMIN — Medication 100 MILLIGRAM(S): at 21:40

## 2018-05-15 RX ADMIN — ATORVASTATIN CALCIUM 20 MILLIGRAM(S): 80 TABLET, FILM COATED ORAL at 21:40

## 2018-05-15 RX ADMIN — APIXABAN 5 MILLIGRAM(S): 2.5 TABLET, FILM COATED ORAL at 09:26

## 2018-05-15 RX ADMIN — Medication 50 MILLIGRAM(S): at 09:26

## 2018-05-15 RX ADMIN — APIXABAN 5 MILLIGRAM(S): 2.5 TABLET, FILM COATED ORAL at 21:40

## 2018-05-15 RX ADMIN — ESCITALOPRAM OXALATE 10 MILLIGRAM(S): 10 TABLET, FILM COATED ORAL at 08:34

## 2018-05-15 RX ADMIN — Medication 75 MICROGRAM(S): at 09:26

## 2018-05-15 RX ADMIN — Medication 40 MILLIGRAM(S): at 08:34

## 2018-05-15 RX ADMIN — AMLODIPINE BESYLATE 5 MILLIGRAM(S): 2.5 TABLET ORAL at 09:26

## 2018-05-15 RX ADMIN — Medication 50 MILLIGRAM(S): at 13:58

## 2018-05-15 RX ADMIN — Medication 100 MILLIGRAM(S): at 09:26

## 2018-05-15 RX ADMIN — Medication 50 MILLIGRAM(S): at 21:40

## 2018-05-16 PROCEDURE — 99232 SBSQ HOSP IP/OBS MODERATE 35: CPT | Mod: 25

## 2018-05-16 PROCEDURE — 90870 ELECTROCONVULSIVE THERAPY: CPT

## 2018-05-16 RX ADMIN — Medication 100 MILLIGRAM(S): at 21:57

## 2018-05-16 RX ADMIN — ESCITALOPRAM OXALATE 10 MILLIGRAM(S): 10 TABLET, FILM COATED ORAL at 10:51

## 2018-05-16 RX ADMIN — ATORVASTATIN CALCIUM 20 MILLIGRAM(S): 80 TABLET, FILM COATED ORAL at 21:57

## 2018-05-16 RX ADMIN — APIXABAN 5 MILLIGRAM(S): 2.5 TABLET, FILM COATED ORAL at 06:31

## 2018-05-16 RX ADMIN — Medication 100 MILLIGRAM(S): at 06:31

## 2018-05-16 RX ADMIN — Medication 50 MILLIGRAM(S): at 13:00

## 2018-05-16 RX ADMIN — AMLODIPINE BESYLATE 5 MILLIGRAM(S): 2.5 TABLET ORAL at 06:31

## 2018-05-16 RX ADMIN — Medication 50 MILLIGRAM(S): at 21:57

## 2018-05-16 RX ADMIN — Medication 40 MILLIGRAM(S): at 10:51

## 2018-05-16 RX ADMIN — Medication 50 MILLIGRAM(S): at 06:31

## 2018-05-16 RX ADMIN — APIXABAN 5 MILLIGRAM(S): 2.5 TABLET, FILM COATED ORAL at 21:57

## 2018-05-16 RX ADMIN — Medication 75 MICROGRAM(S): at 06:31

## 2018-05-17 PROCEDURE — 99232 SBSQ HOSP IP/OBS MODERATE 35: CPT

## 2018-05-17 RX ADMIN — Medication 40 MILLIGRAM(S): at 10:29

## 2018-05-17 RX ADMIN — APIXABAN 5 MILLIGRAM(S): 2.5 TABLET, FILM COATED ORAL at 06:28

## 2018-05-17 RX ADMIN — ESCITALOPRAM OXALATE 10 MILLIGRAM(S): 10 TABLET, FILM COATED ORAL at 10:29

## 2018-05-17 RX ADMIN — Medication 100 MILLIGRAM(S): at 06:28

## 2018-05-17 RX ADMIN — AMLODIPINE BESYLATE 5 MILLIGRAM(S): 2.5 TABLET ORAL at 06:28

## 2018-05-17 RX ADMIN — Medication 50 MILLIGRAM(S): at 21:33

## 2018-05-17 RX ADMIN — Medication 50 MILLIGRAM(S): at 06:28

## 2018-05-17 RX ADMIN — Medication 75 MICROGRAM(S): at 06:28

## 2018-05-17 RX ADMIN — APIXABAN 5 MILLIGRAM(S): 2.5 TABLET, FILM COATED ORAL at 21:33

## 2018-05-17 RX ADMIN — ATORVASTATIN CALCIUM 20 MILLIGRAM(S): 80 TABLET, FILM COATED ORAL at 21:33

## 2018-05-17 RX ADMIN — Medication 50 MILLIGRAM(S): at 13:04

## 2018-05-17 RX ADMIN — Medication 100 MILLIGRAM(S): at 21:33

## 2018-05-18 PROCEDURE — 99232 SBSQ HOSP IP/OBS MODERATE 35: CPT | Mod: 25

## 2018-05-18 PROCEDURE — 90870 ELECTROCONVULSIVE THERAPY: CPT

## 2018-05-18 RX ADMIN — Medication 75 MICROGRAM(S): at 06:42

## 2018-05-18 RX ADMIN — AMLODIPINE BESYLATE 5 MILLIGRAM(S): 2.5 TABLET ORAL at 06:41

## 2018-05-18 RX ADMIN — ATORVASTATIN CALCIUM 20 MILLIGRAM(S): 80 TABLET, FILM COATED ORAL at 21:02

## 2018-05-18 RX ADMIN — APIXABAN 5 MILLIGRAM(S): 2.5 TABLET, FILM COATED ORAL at 06:42

## 2018-05-18 RX ADMIN — Medication 50 MILLIGRAM(S): at 21:01

## 2018-05-18 RX ADMIN — ESCITALOPRAM OXALATE 10 MILLIGRAM(S): 10 TABLET, FILM COATED ORAL at 10:58

## 2018-05-18 RX ADMIN — Medication 100 MILLIGRAM(S): at 06:42

## 2018-05-18 RX ADMIN — APIXABAN 5 MILLIGRAM(S): 2.5 TABLET, FILM COATED ORAL at 21:02

## 2018-05-18 RX ADMIN — Medication 40 MILLIGRAM(S): at 10:58

## 2018-05-18 RX ADMIN — Medication 50 MILLIGRAM(S): at 12:58

## 2018-05-18 RX ADMIN — Medication 100 MILLIGRAM(S): at 21:01

## 2018-05-18 RX ADMIN — Medication 50 MILLIGRAM(S): at 06:42

## 2018-05-19 RX ADMIN — ESCITALOPRAM OXALATE 10 MILLIGRAM(S): 10 TABLET, FILM COATED ORAL at 09:48

## 2018-05-19 RX ADMIN — Medication 100 MILLIGRAM(S): at 06:32

## 2018-05-19 RX ADMIN — Medication 50 MILLIGRAM(S): at 20:49

## 2018-05-19 RX ADMIN — APIXABAN 5 MILLIGRAM(S): 2.5 TABLET, FILM COATED ORAL at 06:31

## 2018-05-19 RX ADMIN — ATORVASTATIN CALCIUM 20 MILLIGRAM(S): 80 TABLET, FILM COATED ORAL at 20:49

## 2018-05-19 RX ADMIN — Medication 50 MILLIGRAM(S): at 06:31

## 2018-05-19 RX ADMIN — Medication 40 MILLIGRAM(S): at 09:48

## 2018-05-19 RX ADMIN — APIXABAN 5 MILLIGRAM(S): 2.5 TABLET, FILM COATED ORAL at 20:49

## 2018-05-19 RX ADMIN — Medication 50 MILLIGRAM(S): at 13:53

## 2018-05-19 RX ADMIN — AMLODIPINE BESYLATE 5 MILLIGRAM(S): 2.5 TABLET ORAL at 06:31

## 2018-05-19 RX ADMIN — Medication 100 MILLIGRAM(S): at 20:49

## 2018-05-19 RX ADMIN — Medication 75 MICROGRAM(S): at 06:32

## 2018-05-20 RX ADMIN — APIXABAN 5 MILLIGRAM(S): 2.5 TABLET, FILM COATED ORAL at 06:48

## 2018-05-20 RX ADMIN — Medication 100 MILLIGRAM(S): at 06:48

## 2018-05-20 RX ADMIN — Medication 50 MILLIGRAM(S): at 21:07

## 2018-05-20 RX ADMIN — Medication 40 MILLIGRAM(S): at 08:55

## 2018-05-20 RX ADMIN — Medication 75 MICROGRAM(S): at 06:48

## 2018-05-20 RX ADMIN — ATORVASTATIN CALCIUM 20 MILLIGRAM(S): 80 TABLET, FILM COATED ORAL at 21:07

## 2018-05-20 RX ADMIN — Medication 50 MILLIGRAM(S): at 12:55

## 2018-05-20 RX ADMIN — AMLODIPINE BESYLATE 5 MILLIGRAM(S): 2.5 TABLET ORAL at 06:48

## 2018-05-20 RX ADMIN — Medication 100 MILLIGRAM(S): at 21:07

## 2018-05-20 RX ADMIN — Medication 50 MILLIGRAM(S): at 06:48

## 2018-05-20 RX ADMIN — ESCITALOPRAM OXALATE 10 MILLIGRAM(S): 10 TABLET, FILM COATED ORAL at 08:55

## 2018-05-20 RX ADMIN — APIXABAN 5 MILLIGRAM(S): 2.5 TABLET, FILM COATED ORAL at 21:07

## 2018-05-21 PROCEDURE — 99232 SBSQ HOSP IP/OBS MODERATE 35: CPT

## 2018-05-21 RX ADMIN — Medication 40 MILLIGRAM(S): at 09:06

## 2018-05-21 RX ADMIN — Medication 50 MILLIGRAM(S): at 06:21

## 2018-05-21 RX ADMIN — ESCITALOPRAM OXALATE 10 MILLIGRAM(S): 10 TABLET, FILM COATED ORAL at 09:06

## 2018-05-21 RX ADMIN — AMLODIPINE BESYLATE 5 MILLIGRAM(S): 2.5 TABLET ORAL at 06:21

## 2018-05-21 RX ADMIN — APIXABAN 5 MILLIGRAM(S): 2.5 TABLET, FILM COATED ORAL at 06:21

## 2018-05-21 RX ADMIN — Medication 50 MILLIGRAM(S): at 21:45

## 2018-05-21 RX ADMIN — Medication 50 MILLIGRAM(S): at 12:41

## 2018-05-21 RX ADMIN — Medication 100 MILLIGRAM(S): at 06:21

## 2018-05-21 RX ADMIN — ATORVASTATIN CALCIUM 20 MILLIGRAM(S): 80 TABLET, FILM COATED ORAL at 21:45

## 2018-05-21 RX ADMIN — APIXABAN 5 MILLIGRAM(S): 2.5 TABLET, FILM COATED ORAL at 21:44

## 2018-05-21 RX ADMIN — Medication 100 MILLIGRAM(S): at 21:45

## 2018-05-21 RX ADMIN — Medication 75 MICROGRAM(S): at 06:21

## 2018-05-22 PROCEDURE — 99232 SBSQ HOSP IP/OBS MODERATE 35: CPT | Mod: 25

## 2018-05-22 PROCEDURE — 90853 GROUP PSYCHOTHERAPY: CPT

## 2018-05-22 RX ADMIN — APIXABAN 5 MILLIGRAM(S): 2.5 TABLET, FILM COATED ORAL at 07:00

## 2018-05-22 RX ADMIN — Medication 50 MILLIGRAM(S): at 22:09

## 2018-05-22 RX ADMIN — Medication 50 MILLIGRAM(S): at 12:43

## 2018-05-22 RX ADMIN — Medication 75 MICROGRAM(S): at 07:00

## 2018-05-22 RX ADMIN — AMLODIPINE BESYLATE 5 MILLIGRAM(S): 2.5 TABLET ORAL at 07:00

## 2018-05-22 RX ADMIN — Medication 100 MILLIGRAM(S): at 07:00

## 2018-05-22 RX ADMIN — Medication 50 MILLIGRAM(S): at 07:00

## 2018-05-22 RX ADMIN — APIXABAN 5 MILLIGRAM(S): 2.5 TABLET, FILM COATED ORAL at 22:09

## 2018-05-22 RX ADMIN — ESCITALOPRAM OXALATE 10 MILLIGRAM(S): 10 TABLET, FILM COATED ORAL at 09:22

## 2018-05-22 RX ADMIN — ATORVASTATIN CALCIUM 20 MILLIGRAM(S): 80 TABLET, FILM COATED ORAL at 22:09

## 2018-05-22 RX ADMIN — Medication 40 MILLIGRAM(S): at 09:22

## 2018-05-22 RX ADMIN — Medication 100 MILLIGRAM(S): at 22:09

## 2018-05-23 LAB
BASOPHILS # BLD AUTO: 0.07 K/UL — SIGNIFICANT CHANGE UP (ref 0–0.2)
BASOPHILS NFR BLD AUTO: 0.8 % — SIGNIFICANT CHANGE UP (ref 0–2)
BUN SERPL-MCNC: 60 MG/DL — HIGH (ref 7–23)
CALCIUM SERPL-MCNC: 9.2 MG/DL — SIGNIFICANT CHANGE UP (ref 8.4–10.5)
CHLORIDE SERPL-SCNC: 101 MMOL/L — SIGNIFICANT CHANGE UP (ref 98–107)
CO2 SERPL-SCNC: 26 MMOL/L — SIGNIFICANT CHANGE UP (ref 22–31)
CREAT SERPL-MCNC: 2.72 MG/DL — HIGH (ref 0.5–1.3)
EOSINOPHIL # BLD AUTO: 0.38 K/UL — SIGNIFICANT CHANGE UP (ref 0–0.5)
EOSINOPHIL NFR BLD AUTO: 4.2 % — SIGNIFICANT CHANGE UP (ref 0–6)
GLUCOSE SERPL-MCNC: 137 MG/DL — HIGH (ref 70–99)
HCT VFR BLD CALC: 29.1 % — LOW (ref 34.5–45)
HGB BLD-MCNC: 9.3 G/DL — LOW (ref 11.5–15.5)
IMM GRANULOCYTES # BLD AUTO: 0.05 # — SIGNIFICANT CHANGE UP
IMM GRANULOCYTES NFR BLD AUTO: 0.5 % — SIGNIFICANT CHANGE UP (ref 0–1.5)
LYMPHOCYTES # BLD AUTO: 1.12 K/UL — SIGNIFICANT CHANGE UP (ref 1–3.3)
LYMPHOCYTES # BLD AUTO: 12.3 % — LOW (ref 13–44)
MCHC RBC-ENTMCNC: 31.4 PG — SIGNIFICANT CHANGE UP (ref 27–34)
MCHC RBC-ENTMCNC: 32 % — SIGNIFICANT CHANGE UP (ref 32–36)
MCV RBC AUTO: 98.3 FL — SIGNIFICANT CHANGE UP (ref 80–100)
MONOCYTES # BLD AUTO: 0.85 K/UL — SIGNIFICANT CHANGE UP (ref 0–0.9)
MONOCYTES NFR BLD AUTO: 9.3 % — SIGNIFICANT CHANGE UP (ref 2–14)
NEUTROPHILS # BLD AUTO: 6.64 K/UL — SIGNIFICANT CHANGE UP (ref 1.8–7.4)
NEUTROPHILS NFR BLD AUTO: 72.9 % — SIGNIFICANT CHANGE UP (ref 43–77)
NRBC # FLD: 0 — SIGNIFICANT CHANGE UP
PLATELET # BLD AUTO: 236 K/UL — SIGNIFICANT CHANGE UP (ref 150–400)
PMV BLD: 11.4 FL — SIGNIFICANT CHANGE UP (ref 7–13)
POTASSIUM SERPL-MCNC: 3.4 MMOL/L — LOW (ref 3.5–5.3)
POTASSIUM SERPL-SCNC: 3.4 MMOL/L — LOW (ref 3.5–5.3)
RBC # BLD: 2.96 M/UL — LOW (ref 3.8–5.2)
RBC # FLD: 13.4 % — SIGNIFICANT CHANGE UP (ref 10.3–14.5)
SODIUM SERPL-SCNC: 140 MMOL/L — SIGNIFICANT CHANGE UP (ref 135–145)
WBC # BLD: 9.11 K/UL — SIGNIFICANT CHANGE UP (ref 3.8–10.5)
WBC # FLD AUTO: 9.11 K/UL — SIGNIFICANT CHANGE UP (ref 3.8–10.5)

## 2018-05-23 PROCEDURE — 90870 ELECTROCONVULSIVE THERAPY: CPT

## 2018-05-23 PROCEDURE — 99232 SBSQ HOSP IP/OBS MODERATE 35: CPT | Mod: 25

## 2018-05-23 RX ADMIN — Medication 100 MILLIGRAM(S): at 07:02

## 2018-05-23 RX ADMIN — ESCITALOPRAM OXALATE 10 MILLIGRAM(S): 10 TABLET, FILM COATED ORAL at 14:05

## 2018-05-23 RX ADMIN — Medication 50 MILLIGRAM(S): at 15:05

## 2018-05-23 RX ADMIN — AMLODIPINE BESYLATE 5 MILLIGRAM(S): 2.5 TABLET ORAL at 07:02

## 2018-05-23 RX ADMIN — ATORVASTATIN CALCIUM 20 MILLIGRAM(S): 80 TABLET, FILM COATED ORAL at 21:30

## 2018-05-23 RX ADMIN — Medication 75 MICROGRAM(S): at 07:02

## 2018-05-23 RX ADMIN — Medication 50 MILLIGRAM(S): at 07:02

## 2018-05-23 RX ADMIN — Medication 50 MILLIGRAM(S): at 21:30

## 2018-05-23 RX ADMIN — APIXABAN 5 MILLIGRAM(S): 2.5 TABLET, FILM COATED ORAL at 07:02

## 2018-05-23 RX ADMIN — Medication 100 MILLIGRAM(S): at 21:30

## 2018-05-23 RX ADMIN — APIXABAN 5 MILLIGRAM(S): 2.5 TABLET, FILM COATED ORAL at 21:30

## 2018-05-24 DIAGNOSIS — N17.9 ACUTE KIDNEY FAILURE, UNSPECIFIED: ICD-10-CM

## 2018-05-24 DIAGNOSIS — E03.9 HYPOTHYROIDISM, UNSPECIFIED: ICD-10-CM

## 2018-05-24 DIAGNOSIS — I50.32 CHRONIC DIASTOLIC (CONGESTIVE) HEART FAILURE: ICD-10-CM

## 2018-05-24 LAB
BUN SERPL-MCNC: 63 MG/DL — HIGH (ref 7–23)
CALCIUM SERPL-MCNC: 9.3 MG/DL — SIGNIFICANT CHANGE UP (ref 8.4–10.5)
CHLORIDE SERPL-SCNC: 99 MMOL/L — SIGNIFICANT CHANGE UP (ref 98–107)
CO2 SERPL-SCNC: 27 MMOL/L — SIGNIFICANT CHANGE UP (ref 22–31)
CREAT SERPL-MCNC: 2.46 MG/DL — HIGH (ref 0.5–1.3)
GLUCOSE SERPL-MCNC: 221 MG/DL — HIGH (ref 70–99)
POTASSIUM SERPL-MCNC: 3.7 MMOL/L — SIGNIFICANT CHANGE UP (ref 3.5–5.3)
POTASSIUM SERPL-SCNC: 3.7 MMOL/L — SIGNIFICANT CHANGE UP (ref 3.5–5.3)
SODIUM SERPL-SCNC: 139 MMOL/L — SIGNIFICANT CHANGE UP (ref 135–145)

## 2018-05-24 PROCEDURE — 90853 GROUP PSYCHOTHERAPY: CPT

## 2018-05-24 PROCEDURE — 99232 SBSQ HOSP IP/OBS MODERATE 35: CPT | Mod: 25

## 2018-05-24 PROCEDURE — 99233 SBSQ HOSP IP/OBS HIGH 50: CPT

## 2018-05-24 RX ORDER — POLYETHYLENE GLYCOL 3350 17 G/17G
17 POWDER, FOR SOLUTION ORAL DAILY
Qty: 0 | Refills: 0 | Status: DISCONTINUED | OUTPATIENT
Start: 2018-05-24 | End: 2018-05-25

## 2018-05-24 RX ORDER — PHENYLEPHRINE-SHARK LIVER OIL-MINERAL OIL-PETROLATUM RECTAL OINTMENT
1 OINTMENT (GRAM) RECTAL AT BEDTIME
Qty: 0 | Refills: 0 | Status: DISCONTINUED | OUTPATIENT
Start: 2018-05-24 | End: 2018-06-19

## 2018-05-24 RX ORDER — MINERAL OIL
133 OIL (ML) MISCELLANEOUS ONCE
Qty: 0 | Refills: 0 | Status: COMPLETED | OUTPATIENT
Start: 2018-05-24 | End: 2018-05-24

## 2018-05-24 RX ADMIN — APIXABAN 5 MILLIGRAM(S): 2.5 TABLET, FILM COATED ORAL at 07:11

## 2018-05-24 RX ADMIN — Medication 50 MILLIGRAM(S): at 21:48

## 2018-05-24 RX ADMIN — ESCITALOPRAM OXALATE 10 MILLIGRAM(S): 10 TABLET, FILM COATED ORAL at 08:50

## 2018-05-24 RX ADMIN — APIXABAN 5 MILLIGRAM(S): 2.5 TABLET, FILM COATED ORAL at 21:49

## 2018-05-24 RX ADMIN — Medication 133 MILLILITER(S): at 18:27

## 2018-05-24 RX ADMIN — Medication 100 MILLIGRAM(S): at 07:11

## 2018-05-24 RX ADMIN — Medication 50 MILLIGRAM(S): at 13:16

## 2018-05-24 RX ADMIN — PHENYLEPHRINE-SHARK LIVER OIL-MINERAL OIL-PETROLATUM RECTAL OINTMENT 1 APPLICATION(S): at 21:48

## 2018-05-24 RX ADMIN — AMLODIPINE BESYLATE 5 MILLIGRAM(S): 2.5 TABLET ORAL at 07:11

## 2018-05-24 RX ADMIN — Medication 75 MICROGRAM(S): at 07:11

## 2018-05-24 RX ADMIN — Medication 100 MILLIGRAM(S): at 21:49

## 2018-05-24 RX ADMIN — Medication 50 MILLIGRAM(S): at 07:11

## 2018-05-24 RX ADMIN — ATORVASTATIN CALCIUM 20 MILLIGRAM(S): 80 TABLET, FILM COATED ORAL at 21:49

## 2018-05-24 RX ADMIN — Medication 1 ENEMA: at 22:00

## 2018-05-25 DIAGNOSIS — K59.00 CONSTIPATION, UNSPECIFIED: ICD-10-CM

## 2018-05-25 PROCEDURE — 99233 SBSQ HOSP IP/OBS HIGH 50: CPT

## 2018-05-25 PROCEDURE — 90870 ELECTROCONVULSIVE THERAPY: CPT

## 2018-05-25 PROCEDURE — 99232 SBSQ HOSP IP/OBS MODERATE 35: CPT | Mod: 25

## 2018-05-25 RX ORDER — POLYETHYLENE GLYCOL 3350 17 G/17G
17 POWDER, FOR SOLUTION ORAL
Qty: 0 | Refills: 0 | Status: DISCONTINUED | OUTPATIENT
Start: 2018-05-25 | End: 2018-06-19

## 2018-05-25 RX ADMIN — AMLODIPINE BESYLATE 5 MILLIGRAM(S): 2.5 TABLET ORAL at 06:39

## 2018-05-25 RX ADMIN — Medication 100 MILLIGRAM(S): at 06:39

## 2018-05-25 RX ADMIN — Medication 50 MILLIGRAM(S): at 13:08

## 2018-05-25 RX ADMIN — Medication 50 MILLIGRAM(S): at 22:12

## 2018-05-25 RX ADMIN — ATORVASTATIN CALCIUM 20 MILLIGRAM(S): 80 TABLET, FILM COATED ORAL at 22:12

## 2018-05-25 RX ADMIN — Medication 75 MICROGRAM(S): at 06:39

## 2018-05-25 RX ADMIN — APIXABAN 5 MILLIGRAM(S): 2.5 TABLET, FILM COATED ORAL at 22:11

## 2018-05-25 RX ADMIN — Medication 100 MILLIGRAM(S): at 22:12

## 2018-05-25 RX ADMIN — ESCITALOPRAM OXALATE 10 MILLIGRAM(S): 10 TABLET, FILM COATED ORAL at 10:57

## 2018-05-25 RX ADMIN — PHENYLEPHRINE-SHARK LIVER OIL-MINERAL OIL-PETROLATUM RECTAL OINTMENT 1 APPLICATION(S): at 22:11

## 2018-05-25 RX ADMIN — APIXABAN 5 MILLIGRAM(S): 2.5 TABLET, FILM COATED ORAL at 06:39

## 2018-05-25 RX ADMIN — Medication 50 MILLIGRAM(S): at 06:39

## 2018-05-25 RX ADMIN — POLYETHYLENE GLYCOL 3350 17 GRAM(S): 17 POWDER, FOR SOLUTION ORAL at 22:18

## 2018-05-26 PROCEDURE — 99232 SBSQ HOSP IP/OBS MODERATE 35: CPT

## 2018-05-26 RX ADMIN — Medication 50 MILLIGRAM(S): at 06:16

## 2018-05-26 RX ADMIN — Medication 100 MILLIGRAM(S): at 06:15

## 2018-05-26 RX ADMIN — POLYETHYLENE GLYCOL 3350 17 GRAM(S): 17 POWDER, FOR SOLUTION ORAL at 22:02

## 2018-05-26 RX ADMIN — POLYETHYLENE GLYCOL 3350 17 GRAM(S): 17 POWDER, FOR SOLUTION ORAL at 11:39

## 2018-05-26 RX ADMIN — ATORVASTATIN CALCIUM 20 MILLIGRAM(S): 80 TABLET, FILM COATED ORAL at 22:02

## 2018-05-26 RX ADMIN — Medication 50 MILLIGRAM(S): at 22:02

## 2018-05-26 RX ADMIN — APIXABAN 5 MILLIGRAM(S): 2.5 TABLET, FILM COATED ORAL at 06:16

## 2018-05-26 RX ADMIN — Medication 75 MICROGRAM(S): at 06:15

## 2018-05-26 RX ADMIN — ESCITALOPRAM OXALATE 10 MILLIGRAM(S): 10 TABLET, FILM COATED ORAL at 11:39

## 2018-05-26 RX ADMIN — AMLODIPINE BESYLATE 5 MILLIGRAM(S): 2.5 TABLET ORAL at 06:16

## 2018-05-26 RX ADMIN — APIXABAN 5 MILLIGRAM(S): 2.5 TABLET, FILM COATED ORAL at 22:02

## 2018-05-26 RX ADMIN — PHENYLEPHRINE-SHARK LIVER OIL-MINERAL OIL-PETROLATUM RECTAL OINTMENT 1 APPLICATION(S): at 23:16

## 2018-05-26 RX ADMIN — Medication 50 MILLIGRAM(S): at 15:02

## 2018-05-26 RX ADMIN — Medication 100 MILLIGRAM(S): at 22:02

## 2018-05-27 LAB
BUN SERPL-MCNC: 56 MG/DL — HIGH (ref 7–23)
CALCIUM SERPL-MCNC: 9.4 MG/DL — SIGNIFICANT CHANGE UP (ref 8.4–10.5)
CHLORIDE SERPL-SCNC: 99 MMOL/L — SIGNIFICANT CHANGE UP (ref 98–107)
CO2 SERPL-SCNC: 27 MMOL/L — SIGNIFICANT CHANGE UP (ref 22–31)
CREAT SERPL-MCNC: 2.12 MG/DL — HIGH (ref 0.5–1.3)
GLUCOSE SERPL-MCNC: 163 MG/DL — HIGH (ref 70–99)
POTASSIUM SERPL-MCNC: 3.9 MMOL/L — SIGNIFICANT CHANGE UP (ref 3.5–5.3)
POTASSIUM SERPL-SCNC: 3.9 MMOL/L — SIGNIFICANT CHANGE UP (ref 3.5–5.3)
SODIUM SERPL-SCNC: 140 MMOL/L — SIGNIFICANT CHANGE UP (ref 135–145)

## 2018-05-27 RX ADMIN — Medication 100 MILLIGRAM(S): at 06:25

## 2018-05-27 RX ADMIN — APIXABAN 5 MILLIGRAM(S): 2.5 TABLET, FILM COATED ORAL at 21:26

## 2018-05-27 RX ADMIN — ESCITALOPRAM OXALATE 10 MILLIGRAM(S): 10 TABLET, FILM COATED ORAL at 10:19

## 2018-05-27 RX ADMIN — ATORVASTATIN CALCIUM 20 MILLIGRAM(S): 80 TABLET, FILM COATED ORAL at 21:26

## 2018-05-27 RX ADMIN — Medication 50 MILLIGRAM(S): at 12:52

## 2018-05-27 RX ADMIN — POLYETHYLENE GLYCOL 3350 17 GRAM(S): 17 POWDER, FOR SOLUTION ORAL at 10:19

## 2018-05-27 RX ADMIN — PHENYLEPHRINE-SHARK LIVER OIL-MINERAL OIL-PETROLATUM RECTAL OINTMENT 1 APPLICATION(S): at 21:26

## 2018-05-27 RX ADMIN — AMLODIPINE BESYLATE 5 MILLIGRAM(S): 2.5 TABLET ORAL at 06:25

## 2018-05-27 RX ADMIN — Medication 100 MILLIGRAM(S): at 21:26

## 2018-05-27 RX ADMIN — Medication 50 MILLIGRAM(S): at 21:26

## 2018-05-27 RX ADMIN — POLYETHYLENE GLYCOL 3350 17 GRAM(S): 17 POWDER, FOR SOLUTION ORAL at 21:26

## 2018-05-27 RX ADMIN — Medication 50 MILLIGRAM(S): at 06:25

## 2018-05-27 RX ADMIN — Medication 75 MICROGRAM(S): at 06:25

## 2018-05-27 RX ADMIN — APIXABAN 5 MILLIGRAM(S): 2.5 TABLET, FILM COATED ORAL at 06:25

## 2018-05-28 PROCEDURE — 99232 SBSQ HOSP IP/OBS MODERATE 35: CPT

## 2018-05-28 RX ADMIN — POLYETHYLENE GLYCOL 3350 17 GRAM(S): 17 POWDER, FOR SOLUTION ORAL at 10:18

## 2018-05-28 RX ADMIN — POLYETHYLENE GLYCOL 3350 17 GRAM(S): 17 POWDER, FOR SOLUTION ORAL at 21:23

## 2018-05-28 RX ADMIN — ATORVASTATIN CALCIUM 20 MILLIGRAM(S): 80 TABLET, FILM COATED ORAL at 21:22

## 2018-05-28 RX ADMIN — ESCITALOPRAM OXALATE 10 MILLIGRAM(S): 10 TABLET, FILM COATED ORAL at 10:18

## 2018-05-28 RX ADMIN — Medication 50 MILLIGRAM(S): at 06:33

## 2018-05-28 RX ADMIN — Medication 50 MILLIGRAM(S): at 13:57

## 2018-05-28 RX ADMIN — AMLODIPINE BESYLATE 5 MILLIGRAM(S): 2.5 TABLET ORAL at 06:33

## 2018-05-28 RX ADMIN — APIXABAN 5 MILLIGRAM(S): 2.5 TABLET, FILM COATED ORAL at 06:33

## 2018-05-28 RX ADMIN — Medication 100 MILLIGRAM(S): at 06:33

## 2018-05-28 RX ADMIN — Medication 75 MICROGRAM(S): at 06:33

## 2018-05-28 RX ADMIN — APIXABAN 5 MILLIGRAM(S): 2.5 TABLET, FILM COATED ORAL at 21:22

## 2018-05-28 RX ADMIN — Medication 100 MILLIGRAM(S): at 21:23

## 2018-05-28 RX ADMIN — PHENYLEPHRINE-SHARK LIVER OIL-MINERAL OIL-PETROLATUM RECTAL OINTMENT 1 APPLICATION(S): at 21:22

## 2018-05-28 RX ADMIN — Medication 50 MILLIGRAM(S): at 21:23

## 2018-05-29 PROCEDURE — 90853 GROUP PSYCHOTHERAPY: CPT

## 2018-05-29 PROCEDURE — 99232 SBSQ HOSP IP/OBS MODERATE 35: CPT | Mod: 25

## 2018-05-29 RX ADMIN — POLYETHYLENE GLYCOL 3350 17 GRAM(S): 17 POWDER, FOR SOLUTION ORAL at 08:35

## 2018-05-29 RX ADMIN — Medication 75 MICROGRAM(S): at 06:52

## 2018-05-29 RX ADMIN — APIXABAN 5 MILLIGRAM(S): 2.5 TABLET, FILM COATED ORAL at 21:33

## 2018-05-29 RX ADMIN — Medication 100 MILLIGRAM(S): at 06:52

## 2018-05-29 RX ADMIN — Medication 50 MILLIGRAM(S): at 06:52

## 2018-05-29 RX ADMIN — Medication 50 MILLIGRAM(S): at 21:32

## 2018-05-29 RX ADMIN — POLYETHYLENE GLYCOL 3350 17 GRAM(S): 17 POWDER, FOR SOLUTION ORAL at 21:32

## 2018-05-29 RX ADMIN — ATORVASTATIN CALCIUM 20 MILLIGRAM(S): 80 TABLET, FILM COATED ORAL at 21:33

## 2018-05-29 RX ADMIN — PHENYLEPHRINE-SHARK LIVER OIL-MINERAL OIL-PETROLATUM RECTAL OINTMENT 1 APPLICATION(S): at 21:32

## 2018-05-29 RX ADMIN — ESCITALOPRAM OXALATE 10 MILLIGRAM(S): 10 TABLET, FILM COATED ORAL at 08:35

## 2018-05-29 RX ADMIN — Medication 50 MILLIGRAM(S): at 12:56

## 2018-05-29 RX ADMIN — Medication 100 MILLIGRAM(S): at 21:32

## 2018-05-29 RX ADMIN — APIXABAN 5 MILLIGRAM(S): 2.5 TABLET, FILM COATED ORAL at 06:52

## 2018-05-29 RX ADMIN — AMLODIPINE BESYLATE 5 MILLIGRAM(S): 2.5 TABLET ORAL at 06:52

## 2018-05-30 DIAGNOSIS — I10 ESSENTIAL (PRIMARY) HYPERTENSION: ICD-10-CM

## 2018-05-30 DIAGNOSIS — I25.10 ATHEROSCLEROTIC HEART DISEASE OF NATIVE CORONARY ARTERY WITHOUT ANGINA PECTORIS: ICD-10-CM

## 2018-05-30 PROCEDURE — 90870 ELECTROCONVULSIVE THERAPY: CPT

## 2018-05-30 PROCEDURE — 99232 SBSQ HOSP IP/OBS MODERATE 35: CPT | Mod: 25

## 2018-05-30 PROCEDURE — 99233 SBSQ HOSP IP/OBS HIGH 50: CPT

## 2018-05-30 RX ADMIN — PHENYLEPHRINE-SHARK LIVER OIL-MINERAL OIL-PETROLATUM RECTAL OINTMENT 1 APPLICATION(S): at 21:02

## 2018-05-30 RX ADMIN — Medication 100 MILLIGRAM(S): at 21:01

## 2018-05-30 RX ADMIN — ESCITALOPRAM OXALATE 10 MILLIGRAM(S): 10 TABLET, FILM COATED ORAL at 10:20

## 2018-05-30 RX ADMIN — Medication 50 MILLIGRAM(S): at 06:33

## 2018-05-30 RX ADMIN — Medication 75 MICROGRAM(S): at 06:33

## 2018-05-30 RX ADMIN — Medication 50 MILLIGRAM(S): at 21:01

## 2018-05-30 RX ADMIN — Medication 100 MILLIGRAM(S): at 06:33

## 2018-05-30 RX ADMIN — POLYETHYLENE GLYCOL 3350 17 GRAM(S): 17 POWDER, FOR SOLUTION ORAL at 10:20

## 2018-05-30 RX ADMIN — POLYETHYLENE GLYCOL 3350 17 GRAM(S): 17 POWDER, FOR SOLUTION ORAL at 21:01

## 2018-05-30 RX ADMIN — AMLODIPINE BESYLATE 5 MILLIGRAM(S): 2.5 TABLET ORAL at 06:33

## 2018-05-30 RX ADMIN — APIXABAN 5 MILLIGRAM(S): 2.5 TABLET, FILM COATED ORAL at 06:33

## 2018-05-30 RX ADMIN — ATORVASTATIN CALCIUM 20 MILLIGRAM(S): 80 TABLET, FILM COATED ORAL at 21:02

## 2018-05-30 RX ADMIN — APIXABAN 5 MILLIGRAM(S): 2.5 TABLET, FILM COATED ORAL at 21:02

## 2018-05-30 RX ADMIN — Medication 50 MILLIGRAM(S): at 12:56

## 2018-05-31 LAB
BUN SERPL-MCNC: 53 MG/DL — HIGH (ref 7–23)
CALCIUM SERPL-MCNC: 9.2 MG/DL — SIGNIFICANT CHANGE UP (ref 8.4–10.5)
CHLORIDE SERPL-SCNC: 102 MMOL/L — SIGNIFICANT CHANGE UP (ref 98–107)
CO2 SERPL-SCNC: 26 MMOL/L — SIGNIFICANT CHANGE UP (ref 22–31)
CREAT SERPL-MCNC: 2.03 MG/DL — HIGH (ref 0.5–1.3)
GLUCOSE SERPL-MCNC: 152 MG/DL — HIGH (ref 70–99)
POTASSIUM SERPL-MCNC: 4.5 MMOL/L — SIGNIFICANT CHANGE UP (ref 3.5–5.3)
POTASSIUM SERPL-SCNC: 4.5 MMOL/L — SIGNIFICANT CHANGE UP (ref 3.5–5.3)
SODIUM SERPL-SCNC: 141 MMOL/L — SIGNIFICANT CHANGE UP (ref 135–145)

## 2018-05-31 PROCEDURE — 99233 SBSQ HOSP IP/OBS HIGH 50: CPT

## 2018-05-31 PROCEDURE — 99232 SBSQ HOSP IP/OBS MODERATE 35: CPT

## 2018-05-31 RX ADMIN — PHENYLEPHRINE-SHARK LIVER OIL-MINERAL OIL-PETROLATUM RECTAL OINTMENT 1 APPLICATION(S): at 21:42

## 2018-05-31 RX ADMIN — Medication 100 MILLIGRAM(S): at 21:42

## 2018-05-31 RX ADMIN — Medication 75 MICROGRAM(S): at 06:14

## 2018-05-31 RX ADMIN — Medication 50 MILLIGRAM(S): at 06:14

## 2018-05-31 RX ADMIN — ATORVASTATIN CALCIUM 20 MILLIGRAM(S): 80 TABLET, FILM COATED ORAL at 21:42

## 2018-05-31 RX ADMIN — Medication 50 MILLIGRAM(S): at 15:28

## 2018-05-31 RX ADMIN — AMLODIPINE BESYLATE 5 MILLIGRAM(S): 2.5 TABLET ORAL at 06:14

## 2018-05-31 RX ADMIN — POLYETHYLENE GLYCOL 3350 17 GRAM(S): 17 POWDER, FOR SOLUTION ORAL at 21:42

## 2018-05-31 RX ADMIN — APIXABAN 5 MILLIGRAM(S): 2.5 TABLET, FILM COATED ORAL at 21:42

## 2018-05-31 RX ADMIN — POLYETHYLENE GLYCOL 3350 17 GRAM(S): 17 POWDER, FOR SOLUTION ORAL at 10:18

## 2018-05-31 RX ADMIN — Medication 100 MILLIGRAM(S): at 06:14

## 2018-05-31 RX ADMIN — APIXABAN 5 MILLIGRAM(S): 2.5 TABLET, FILM COATED ORAL at 06:14

## 2018-05-31 RX ADMIN — Medication 50 MILLIGRAM(S): at 21:42

## 2018-05-31 RX ADMIN — ESCITALOPRAM OXALATE 10 MILLIGRAM(S): 10 TABLET, FILM COATED ORAL at 10:18

## 2018-06-01 PROCEDURE — 99232 SBSQ HOSP IP/OBS MODERATE 35: CPT | Mod: 25

## 2018-06-01 PROCEDURE — 73120 X-RAY EXAM OF HAND: CPT | Mod: 26,RT

## 2018-06-01 PROCEDURE — 90870 ELECTROCONVULSIVE THERAPY: CPT

## 2018-06-01 RX ADMIN — Medication 50 MILLIGRAM(S): at 12:45

## 2018-06-01 RX ADMIN — APIXABAN 5 MILLIGRAM(S): 2.5 TABLET, FILM COATED ORAL at 06:37

## 2018-06-01 RX ADMIN — Medication 100 MILLIGRAM(S): at 06:38

## 2018-06-01 RX ADMIN — Medication 100 MILLIGRAM(S): at 20:55

## 2018-06-01 RX ADMIN — POLYETHYLENE GLYCOL 3350 17 GRAM(S): 17 POWDER, FOR SOLUTION ORAL at 10:33

## 2018-06-01 RX ADMIN — Medication 50 MILLIGRAM(S): at 20:55

## 2018-06-01 RX ADMIN — ATORVASTATIN CALCIUM 20 MILLIGRAM(S): 80 TABLET, FILM COATED ORAL at 20:54

## 2018-06-01 RX ADMIN — APIXABAN 5 MILLIGRAM(S): 2.5 TABLET, FILM COATED ORAL at 20:54

## 2018-06-01 RX ADMIN — Medication 75 MICROGRAM(S): at 06:38

## 2018-06-01 RX ADMIN — ESCITALOPRAM OXALATE 10 MILLIGRAM(S): 10 TABLET, FILM COATED ORAL at 10:33

## 2018-06-01 RX ADMIN — Medication 50 MILLIGRAM(S): at 06:37

## 2018-06-01 RX ADMIN — AMLODIPINE BESYLATE 5 MILLIGRAM(S): 2.5 TABLET ORAL at 06:37

## 2018-06-01 RX ADMIN — POLYETHYLENE GLYCOL 3350 17 GRAM(S): 17 POWDER, FOR SOLUTION ORAL at 20:55

## 2018-06-02 RX ADMIN — Medication 50 MILLIGRAM(S): at 12:19

## 2018-06-02 RX ADMIN — Medication 75 MICROGRAM(S): at 06:38

## 2018-06-02 RX ADMIN — AMLODIPINE BESYLATE 5 MILLIGRAM(S): 2.5 TABLET ORAL at 06:38

## 2018-06-02 RX ADMIN — POLYETHYLENE GLYCOL 3350 17 GRAM(S): 17 POWDER, FOR SOLUTION ORAL at 08:46

## 2018-06-02 RX ADMIN — POLYETHYLENE GLYCOL 3350 17 GRAM(S): 17 POWDER, FOR SOLUTION ORAL at 22:15

## 2018-06-02 RX ADMIN — Medication 50 MILLIGRAM(S): at 22:14

## 2018-06-02 RX ADMIN — PHENYLEPHRINE-SHARK LIVER OIL-MINERAL OIL-PETROLATUM RECTAL OINTMENT 1 APPLICATION(S): at 22:14

## 2018-06-02 RX ADMIN — Medication 50 MILLIGRAM(S): at 06:38

## 2018-06-02 RX ADMIN — Medication 100 MILLIGRAM(S): at 22:14

## 2018-06-02 RX ADMIN — ESCITALOPRAM OXALATE 10 MILLIGRAM(S): 10 TABLET, FILM COATED ORAL at 08:45

## 2018-06-02 RX ADMIN — ATORVASTATIN CALCIUM 20 MILLIGRAM(S): 80 TABLET, FILM COATED ORAL at 22:15

## 2018-06-02 RX ADMIN — APIXABAN 5 MILLIGRAM(S): 2.5 TABLET, FILM COATED ORAL at 22:15

## 2018-06-02 RX ADMIN — Medication 100 MILLIGRAM(S): at 06:38

## 2018-06-02 RX ADMIN — APIXABAN 5 MILLIGRAM(S): 2.5 TABLET, FILM COATED ORAL at 06:38

## 2018-06-03 RX ADMIN — ATORVASTATIN CALCIUM 20 MILLIGRAM(S): 80 TABLET, FILM COATED ORAL at 21:28

## 2018-06-03 RX ADMIN — ESCITALOPRAM OXALATE 10 MILLIGRAM(S): 10 TABLET, FILM COATED ORAL at 09:10

## 2018-06-03 RX ADMIN — Medication 75 MICROGRAM(S): at 07:16

## 2018-06-03 RX ADMIN — Medication 100 MILLIGRAM(S): at 21:28

## 2018-06-03 RX ADMIN — Medication 50 MILLIGRAM(S): at 07:16

## 2018-06-03 RX ADMIN — PHENYLEPHRINE-SHARK LIVER OIL-MINERAL OIL-PETROLATUM RECTAL OINTMENT 1 APPLICATION(S): at 21:28

## 2018-06-03 RX ADMIN — APIXABAN 5 MILLIGRAM(S): 2.5 TABLET, FILM COATED ORAL at 21:28

## 2018-06-03 RX ADMIN — Medication 50 MILLIGRAM(S): at 12:40

## 2018-06-03 RX ADMIN — AMLODIPINE BESYLATE 5 MILLIGRAM(S): 2.5 TABLET ORAL at 07:16

## 2018-06-03 RX ADMIN — Medication 50 MILLIGRAM(S): at 21:28

## 2018-06-03 RX ADMIN — Medication 100 MILLIGRAM(S): at 07:16

## 2018-06-03 RX ADMIN — POLYETHYLENE GLYCOL 3350 17 GRAM(S): 17 POWDER, FOR SOLUTION ORAL at 09:10

## 2018-06-03 RX ADMIN — APIXABAN 5 MILLIGRAM(S): 2.5 TABLET, FILM COATED ORAL at 07:16

## 2018-06-03 RX ADMIN — POLYETHYLENE GLYCOL 3350 17 GRAM(S): 17 POWDER, FOR SOLUTION ORAL at 21:28

## 2018-06-04 DIAGNOSIS — I48.91 UNSPECIFIED ATRIAL FIBRILLATION: ICD-10-CM

## 2018-06-04 PROCEDURE — 99232 SBSQ HOSP IP/OBS MODERATE 35: CPT | Mod: 25

## 2018-06-04 PROCEDURE — 99233 SBSQ HOSP IP/OBS HIGH 50: CPT

## 2018-06-04 PROCEDURE — 90870 ELECTROCONVULSIVE THERAPY: CPT

## 2018-06-04 RX ADMIN — Medication 100 MILLIGRAM(S): at 06:52

## 2018-06-04 RX ADMIN — Medication 100 MILLIGRAM(S): at 21:23

## 2018-06-04 RX ADMIN — AMLODIPINE BESYLATE 5 MILLIGRAM(S): 2.5 TABLET ORAL at 06:51

## 2018-06-04 RX ADMIN — Medication 50 MILLIGRAM(S): at 06:51

## 2018-06-04 RX ADMIN — Medication 50 MILLIGRAM(S): at 21:23

## 2018-06-04 RX ADMIN — PHENYLEPHRINE-SHARK LIVER OIL-MINERAL OIL-PETROLATUM RECTAL OINTMENT 1 APPLICATION(S): at 21:23

## 2018-06-04 RX ADMIN — POLYETHYLENE GLYCOL 3350 17 GRAM(S): 17 POWDER, FOR SOLUTION ORAL at 12:11

## 2018-06-04 RX ADMIN — Medication 50 MILLIGRAM(S): at 12:11

## 2018-06-04 RX ADMIN — APIXABAN 5 MILLIGRAM(S): 2.5 TABLET, FILM COATED ORAL at 06:51

## 2018-06-04 RX ADMIN — ESCITALOPRAM OXALATE 10 MILLIGRAM(S): 10 TABLET, FILM COATED ORAL at 12:11

## 2018-06-04 RX ADMIN — Medication 75 MICROGRAM(S): at 06:52

## 2018-06-04 RX ADMIN — APIXABAN 5 MILLIGRAM(S): 2.5 TABLET, FILM COATED ORAL at 21:23

## 2018-06-04 RX ADMIN — ATORVASTATIN CALCIUM 20 MILLIGRAM(S): 80 TABLET, FILM COATED ORAL at 21:23

## 2018-06-04 RX ADMIN — POLYETHYLENE GLYCOL 3350 17 GRAM(S): 17 POWDER, FOR SOLUTION ORAL at 21:23

## 2018-06-05 PROCEDURE — 99232 SBSQ HOSP IP/OBS MODERATE 35: CPT

## 2018-06-05 RX ADMIN — Medication 100 MILLIGRAM(S): at 21:01

## 2018-06-05 RX ADMIN — ESCITALOPRAM OXALATE 10 MILLIGRAM(S): 10 TABLET, FILM COATED ORAL at 09:45

## 2018-06-05 RX ADMIN — AMLODIPINE BESYLATE 5 MILLIGRAM(S): 2.5 TABLET ORAL at 06:40

## 2018-06-05 RX ADMIN — APIXABAN 5 MILLIGRAM(S): 2.5 TABLET, FILM COATED ORAL at 21:01

## 2018-06-05 RX ADMIN — ATORVASTATIN CALCIUM 20 MILLIGRAM(S): 80 TABLET, FILM COATED ORAL at 21:01

## 2018-06-05 RX ADMIN — Medication 50 MILLIGRAM(S): at 21:01

## 2018-06-05 RX ADMIN — Medication 50 MILLIGRAM(S): at 12:49

## 2018-06-05 RX ADMIN — Medication 50 MILLIGRAM(S): at 06:39

## 2018-06-05 RX ADMIN — POLYETHYLENE GLYCOL 3350 17 GRAM(S): 17 POWDER, FOR SOLUTION ORAL at 21:01

## 2018-06-05 RX ADMIN — Medication 100 MILLIGRAM(S): at 06:39

## 2018-06-05 RX ADMIN — PHENYLEPHRINE-SHARK LIVER OIL-MINERAL OIL-PETROLATUM RECTAL OINTMENT 1 APPLICATION(S): at 21:01

## 2018-06-05 RX ADMIN — Medication 75 MICROGRAM(S): at 06:39

## 2018-06-05 RX ADMIN — APIXABAN 5 MILLIGRAM(S): 2.5 TABLET, FILM COATED ORAL at 06:40

## 2018-06-06 LAB
BUN SERPL-MCNC: 35 MG/DL — HIGH (ref 7–23)
CALCIUM SERPL-MCNC: 9.3 MG/DL — SIGNIFICANT CHANGE UP (ref 8.4–10.5)
CHLORIDE SERPL-SCNC: 101 MMOL/L — SIGNIFICANT CHANGE UP (ref 98–107)
CO2 SERPL-SCNC: 19 MMOL/L — LOW (ref 22–31)
CREAT SERPL-MCNC: 1.77 MG/DL — HIGH (ref 0.5–1.3)
GLUCOSE SERPL-MCNC: 141 MG/DL — HIGH (ref 70–99)
POTASSIUM SERPL-MCNC: 5.2 MMOL/L — SIGNIFICANT CHANGE UP (ref 3.5–5.3)
POTASSIUM SERPL-SCNC: 5.2 MMOL/L — SIGNIFICANT CHANGE UP (ref 3.5–5.3)
SODIUM SERPL-SCNC: 137 MMOL/L — SIGNIFICANT CHANGE UP (ref 135–145)

## 2018-06-06 PROCEDURE — 99232 SBSQ HOSP IP/OBS MODERATE 35: CPT

## 2018-06-06 RX ADMIN — Medication 50 MILLIGRAM(S): at 06:59

## 2018-06-06 RX ADMIN — Medication 75 MICROGRAM(S): at 06:59

## 2018-06-06 RX ADMIN — ESCITALOPRAM OXALATE 10 MILLIGRAM(S): 10 TABLET, FILM COATED ORAL at 09:08

## 2018-06-06 RX ADMIN — ATORVASTATIN CALCIUM 20 MILLIGRAM(S): 80 TABLET, FILM COATED ORAL at 21:33

## 2018-06-06 RX ADMIN — APIXABAN 5 MILLIGRAM(S): 2.5 TABLET, FILM COATED ORAL at 06:59

## 2018-06-06 RX ADMIN — Medication 50 MILLIGRAM(S): at 21:33

## 2018-06-06 RX ADMIN — POLYETHYLENE GLYCOL 3350 17 GRAM(S): 17 POWDER, FOR SOLUTION ORAL at 09:08

## 2018-06-06 RX ADMIN — Medication 100 MILLIGRAM(S): at 21:33

## 2018-06-06 RX ADMIN — POLYETHYLENE GLYCOL 3350 17 GRAM(S): 17 POWDER, FOR SOLUTION ORAL at 21:33

## 2018-06-06 RX ADMIN — PHENYLEPHRINE-SHARK LIVER OIL-MINERAL OIL-PETROLATUM RECTAL OINTMENT 1 APPLICATION(S): at 21:33

## 2018-06-06 RX ADMIN — Medication 50 MILLIGRAM(S): at 13:46

## 2018-06-06 RX ADMIN — APIXABAN 5 MILLIGRAM(S): 2.5 TABLET, FILM COATED ORAL at 21:33

## 2018-06-06 RX ADMIN — AMLODIPINE BESYLATE 5 MILLIGRAM(S): 2.5 TABLET ORAL at 06:59

## 2018-06-06 RX ADMIN — Medication 100 MILLIGRAM(S): at 06:59

## 2018-06-07 PROCEDURE — 99232 SBSQ HOSP IP/OBS MODERATE 35: CPT

## 2018-06-07 RX ADMIN — AMLODIPINE BESYLATE 5 MILLIGRAM(S): 2.5 TABLET ORAL at 06:47

## 2018-06-07 RX ADMIN — ESCITALOPRAM OXALATE 10 MILLIGRAM(S): 10 TABLET, FILM COATED ORAL at 09:33

## 2018-06-07 RX ADMIN — POLYETHYLENE GLYCOL 3350 17 GRAM(S): 17 POWDER, FOR SOLUTION ORAL at 09:34

## 2018-06-07 RX ADMIN — Medication 100 MILLIGRAM(S): at 06:47

## 2018-06-07 RX ADMIN — Medication 75 MICROGRAM(S): at 06:47

## 2018-06-07 RX ADMIN — PHENYLEPHRINE-SHARK LIVER OIL-MINERAL OIL-PETROLATUM RECTAL OINTMENT 1 APPLICATION(S): at 22:29

## 2018-06-07 RX ADMIN — Medication 50 MILLIGRAM(S): at 06:47

## 2018-06-07 RX ADMIN — APIXABAN 5 MILLIGRAM(S): 2.5 TABLET, FILM COATED ORAL at 06:47

## 2018-06-07 RX ADMIN — POLYETHYLENE GLYCOL 3350 17 GRAM(S): 17 POWDER, FOR SOLUTION ORAL at 21:09

## 2018-06-07 RX ADMIN — ATORVASTATIN CALCIUM 20 MILLIGRAM(S): 80 TABLET, FILM COATED ORAL at 21:09

## 2018-06-07 RX ADMIN — APIXABAN 5 MILLIGRAM(S): 2.5 TABLET, FILM COATED ORAL at 21:09

## 2018-06-07 RX ADMIN — Medication 100 MILLIGRAM(S): at 21:09

## 2018-06-07 RX ADMIN — Medication 50 MILLIGRAM(S): at 13:14

## 2018-06-07 RX ADMIN — Medication 50 MILLIGRAM(S): at 21:09

## 2018-06-08 PROCEDURE — 99232 SBSQ HOSP IP/OBS MODERATE 35: CPT | Mod: 25

## 2018-06-08 PROCEDURE — 90870 ELECTROCONVULSIVE THERAPY: CPT

## 2018-06-08 RX ADMIN — Medication 75 MICROGRAM(S): at 06:28

## 2018-06-08 RX ADMIN — APIXABAN 5 MILLIGRAM(S): 2.5 TABLET, FILM COATED ORAL at 06:28

## 2018-06-08 RX ADMIN — Medication 100 MILLIGRAM(S): at 06:28

## 2018-06-08 RX ADMIN — ATORVASTATIN CALCIUM 20 MILLIGRAM(S): 80 TABLET, FILM COATED ORAL at 21:25

## 2018-06-08 RX ADMIN — APIXABAN 5 MILLIGRAM(S): 2.5 TABLET, FILM COATED ORAL at 21:25

## 2018-06-08 RX ADMIN — POLYETHYLENE GLYCOL 3350 17 GRAM(S): 17 POWDER, FOR SOLUTION ORAL at 13:06

## 2018-06-08 RX ADMIN — Medication 50 MILLIGRAM(S): at 13:06

## 2018-06-08 RX ADMIN — Medication 100 MILLIGRAM(S): at 21:25

## 2018-06-08 RX ADMIN — ESCITALOPRAM OXALATE 10 MILLIGRAM(S): 10 TABLET, FILM COATED ORAL at 13:06

## 2018-06-08 RX ADMIN — Medication 50 MILLIGRAM(S): at 21:25

## 2018-06-08 RX ADMIN — POLYETHYLENE GLYCOL 3350 17 GRAM(S): 17 POWDER, FOR SOLUTION ORAL at 21:55

## 2018-06-08 RX ADMIN — PHENYLEPHRINE-SHARK LIVER OIL-MINERAL OIL-PETROLATUM RECTAL OINTMENT 1 APPLICATION(S): at 21:25

## 2018-06-08 RX ADMIN — Medication 50 MILLIGRAM(S): at 06:28

## 2018-06-08 RX ADMIN — AMLODIPINE BESYLATE 5 MILLIGRAM(S): 2.5 TABLET ORAL at 06:28

## 2018-06-09 RX ADMIN — Medication 100 MILLIGRAM(S): at 21:44

## 2018-06-09 RX ADMIN — POLYETHYLENE GLYCOL 3350 17 GRAM(S): 17 POWDER, FOR SOLUTION ORAL at 08:53

## 2018-06-09 RX ADMIN — Medication 50 MILLIGRAM(S): at 12:35

## 2018-06-09 RX ADMIN — Medication 50 MILLIGRAM(S): at 21:44

## 2018-06-09 RX ADMIN — APIXABAN 5 MILLIGRAM(S): 2.5 TABLET, FILM COATED ORAL at 06:36

## 2018-06-09 RX ADMIN — APIXABAN 5 MILLIGRAM(S): 2.5 TABLET, FILM COATED ORAL at 21:44

## 2018-06-09 RX ADMIN — ATORVASTATIN CALCIUM 20 MILLIGRAM(S): 80 TABLET, FILM COATED ORAL at 21:44

## 2018-06-09 RX ADMIN — ESCITALOPRAM OXALATE 10 MILLIGRAM(S): 10 TABLET, FILM COATED ORAL at 08:53

## 2018-06-09 RX ADMIN — POLYETHYLENE GLYCOL 3350 17 GRAM(S): 17 POWDER, FOR SOLUTION ORAL at 21:44

## 2018-06-09 RX ADMIN — Medication 75 MICROGRAM(S): at 06:36

## 2018-06-09 RX ADMIN — PHENYLEPHRINE-SHARK LIVER OIL-MINERAL OIL-PETROLATUM RECTAL OINTMENT 1 APPLICATION(S): at 21:44

## 2018-06-09 RX ADMIN — Medication 50 MILLIGRAM(S): at 06:36

## 2018-06-09 RX ADMIN — AMLODIPINE BESYLATE 5 MILLIGRAM(S): 2.5 TABLET ORAL at 06:36

## 2018-06-09 RX ADMIN — Medication 100 MILLIGRAM(S): at 06:36

## 2018-06-10 PROCEDURE — 99232 SBSQ HOSP IP/OBS MODERATE 35: CPT

## 2018-06-10 RX ADMIN — AMLODIPINE BESYLATE 5 MILLIGRAM(S): 2.5 TABLET ORAL at 06:51

## 2018-06-10 RX ADMIN — Medication 75 MICROGRAM(S): at 06:51

## 2018-06-10 RX ADMIN — Medication 100 MILLIGRAM(S): at 20:58

## 2018-06-10 RX ADMIN — Medication 50 MILLIGRAM(S): at 13:19

## 2018-06-10 RX ADMIN — ATORVASTATIN CALCIUM 20 MILLIGRAM(S): 80 TABLET, FILM COATED ORAL at 20:58

## 2018-06-10 RX ADMIN — POLYETHYLENE GLYCOL 3350 17 GRAM(S): 17 POWDER, FOR SOLUTION ORAL at 20:58

## 2018-06-10 RX ADMIN — Medication 50 MILLIGRAM(S): at 20:58

## 2018-06-10 RX ADMIN — APIXABAN 5 MILLIGRAM(S): 2.5 TABLET, FILM COATED ORAL at 20:58

## 2018-06-10 RX ADMIN — Medication 50 MILLIGRAM(S): at 06:51

## 2018-06-10 RX ADMIN — PHENYLEPHRINE-SHARK LIVER OIL-MINERAL OIL-PETROLATUM RECTAL OINTMENT 1 APPLICATION(S): at 20:58

## 2018-06-10 RX ADMIN — ESCITALOPRAM OXALATE 10 MILLIGRAM(S): 10 TABLET, FILM COATED ORAL at 08:50

## 2018-06-10 RX ADMIN — POLYETHYLENE GLYCOL 3350 17 GRAM(S): 17 POWDER, FOR SOLUTION ORAL at 08:50

## 2018-06-10 RX ADMIN — APIXABAN 5 MILLIGRAM(S): 2.5 TABLET, FILM COATED ORAL at 06:51

## 2018-06-10 RX ADMIN — Medication 100 MILLIGRAM(S): at 06:51

## 2018-06-11 PROCEDURE — 99233 SBSQ HOSP IP/OBS HIGH 50: CPT

## 2018-06-11 PROCEDURE — 99232 SBSQ HOSP IP/OBS MODERATE 35: CPT

## 2018-06-11 RX ORDER — AMLODIPINE BESYLATE 2.5 MG/1
10 TABLET ORAL DAILY
Qty: 0 | Refills: 0 | Status: DISCONTINUED | OUTPATIENT
Start: 2018-06-11 | End: 2018-06-13

## 2018-06-11 RX ADMIN — AMLODIPINE BESYLATE 5 MILLIGRAM(S): 2.5 TABLET ORAL at 06:39

## 2018-06-11 RX ADMIN — Medication 50 MILLIGRAM(S): at 06:39

## 2018-06-11 RX ADMIN — ESCITALOPRAM OXALATE 10 MILLIGRAM(S): 10 TABLET, FILM COATED ORAL at 08:48

## 2018-06-11 RX ADMIN — APIXABAN 5 MILLIGRAM(S): 2.5 TABLET, FILM COATED ORAL at 21:37

## 2018-06-11 RX ADMIN — PHENYLEPHRINE-SHARK LIVER OIL-MINERAL OIL-PETROLATUM RECTAL OINTMENT 1 APPLICATION(S): at 21:37

## 2018-06-11 RX ADMIN — Medication 50 MILLIGRAM(S): at 13:09

## 2018-06-11 RX ADMIN — POLYETHYLENE GLYCOL 3350 17 GRAM(S): 17 POWDER, FOR SOLUTION ORAL at 21:37

## 2018-06-11 RX ADMIN — Medication 50 MILLIGRAM(S): at 21:37

## 2018-06-11 RX ADMIN — APIXABAN 5 MILLIGRAM(S): 2.5 TABLET, FILM COATED ORAL at 06:39

## 2018-06-11 RX ADMIN — POLYETHYLENE GLYCOL 3350 17 GRAM(S): 17 POWDER, FOR SOLUTION ORAL at 09:22

## 2018-06-11 RX ADMIN — ATORVASTATIN CALCIUM 20 MILLIGRAM(S): 80 TABLET, FILM COATED ORAL at 21:37

## 2018-06-11 RX ADMIN — Medication 100 MILLIGRAM(S): at 06:39

## 2018-06-11 RX ADMIN — Medication 100 MILLIGRAM(S): at 21:37

## 2018-06-11 RX ADMIN — Medication 75 MICROGRAM(S): at 06:39

## 2018-06-12 PROCEDURE — 99232 SBSQ HOSP IP/OBS MODERATE 35: CPT

## 2018-06-12 RX ADMIN — Medication 50 MILLIGRAM(S): at 06:55

## 2018-06-12 RX ADMIN — Medication 100 MILLIGRAM(S): at 06:56

## 2018-06-12 RX ADMIN — POLYETHYLENE GLYCOL 3350 17 GRAM(S): 17 POWDER, FOR SOLUTION ORAL at 21:15

## 2018-06-12 RX ADMIN — ESCITALOPRAM OXALATE 10 MILLIGRAM(S): 10 TABLET, FILM COATED ORAL at 08:41

## 2018-06-12 RX ADMIN — Medication 75 MICROGRAM(S): at 06:55

## 2018-06-12 RX ADMIN — AMLODIPINE BESYLATE 10 MILLIGRAM(S): 2.5 TABLET ORAL at 08:41

## 2018-06-12 RX ADMIN — Medication 100 MILLIGRAM(S): at 21:43

## 2018-06-12 RX ADMIN — POLYETHYLENE GLYCOL 3350 17 GRAM(S): 17 POWDER, FOR SOLUTION ORAL at 08:41

## 2018-06-12 RX ADMIN — APIXABAN 5 MILLIGRAM(S): 2.5 TABLET, FILM COATED ORAL at 21:15

## 2018-06-12 RX ADMIN — Medication 50 MILLIGRAM(S): at 12:37

## 2018-06-12 RX ADMIN — Medication 50 MILLIGRAM(S): at 21:16

## 2018-06-12 RX ADMIN — ATORVASTATIN CALCIUM 20 MILLIGRAM(S): 80 TABLET, FILM COATED ORAL at 21:15

## 2018-06-12 RX ADMIN — PHENYLEPHRINE-SHARK LIVER OIL-MINERAL OIL-PETROLATUM RECTAL OINTMENT 1 APPLICATION(S): at 21:43

## 2018-06-12 RX ADMIN — APIXABAN 5 MILLIGRAM(S): 2.5 TABLET, FILM COATED ORAL at 06:55

## 2018-06-13 PROCEDURE — 99232 SBSQ HOSP IP/OBS MODERATE 35: CPT | Mod: 25

## 2018-06-13 PROCEDURE — 90870 ELECTROCONVULSIVE THERAPY: CPT

## 2018-06-13 RX ORDER — AMLODIPINE BESYLATE 2.5 MG/1
10 TABLET ORAL
Qty: 0 | Refills: 0 | Status: DISCONTINUED | OUTPATIENT
Start: 2018-06-14 | End: 2018-06-19

## 2018-06-13 RX ADMIN — Medication 100 MILLIGRAM(S): at 06:55

## 2018-06-13 RX ADMIN — Medication 50 MILLIGRAM(S): at 06:55

## 2018-06-13 RX ADMIN — PHENYLEPHRINE-SHARK LIVER OIL-MINERAL OIL-PETROLATUM RECTAL OINTMENT 1 APPLICATION(S): at 20:58

## 2018-06-13 RX ADMIN — POLYETHYLENE GLYCOL 3350 17 GRAM(S): 17 POWDER, FOR SOLUTION ORAL at 20:58

## 2018-06-13 RX ADMIN — Medication 50 MILLIGRAM(S): at 20:58

## 2018-06-13 RX ADMIN — Medication 75 MICROGRAM(S): at 06:55

## 2018-06-13 RX ADMIN — APIXABAN 5 MILLIGRAM(S): 2.5 TABLET, FILM COATED ORAL at 06:55

## 2018-06-13 RX ADMIN — AMLODIPINE BESYLATE 10 MILLIGRAM(S): 2.5 TABLET ORAL at 10:03

## 2018-06-13 RX ADMIN — ATORVASTATIN CALCIUM 20 MILLIGRAM(S): 80 TABLET, FILM COATED ORAL at 20:58

## 2018-06-13 RX ADMIN — Medication 50 MILLIGRAM(S): at 13:22

## 2018-06-13 RX ADMIN — APIXABAN 5 MILLIGRAM(S): 2.5 TABLET, FILM COATED ORAL at 20:58

## 2018-06-13 RX ADMIN — Medication 100 MILLIGRAM(S): at 20:58

## 2018-06-13 RX ADMIN — ESCITALOPRAM OXALATE 10 MILLIGRAM(S): 10 TABLET, FILM COATED ORAL at 10:03

## 2018-06-13 RX ADMIN — POLYETHYLENE GLYCOL 3350 17 GRAM(S): 17 POWDER, FOR SOLUTION ORAL at 10:23

## 2018-06-14 LAB
BUN SERPL-MCNC: 32 MG/DL — HIGH (ref 7–23)
CALCIUM SERPL-MCNC: 9 MG/DL — SIGNIFICANT CHANGE UP (ref 8.4–10.5)
CHLORIDE SERPL-SCNC: 102 MMOL/L — SIGNIFICANT CHANGE UP (ref 98–107)
CO2 SERPL-SCNC: 23 MMOL/L — SIGNIFICANT CHANGE UP (ref 22–31)
CREAT SERPL-MCNC: 1.64 MG/DL — HIGH (ref 0.5–1.3)
GLUCOSE SERPL-MCNC: 154 MG/DL — HIGH (ref 70–99)
POTASSIUM SERPL-MCNC: 4.7 MMOL/L — SIGNIFICANT CHANGE UP (ref 3.5–5.3)
POTASSIUM SERPL-SCNC: 4.7 MMOL/L — SIGNIFICANT CHANGE UP (ref 3.5–5.3)
SODIUM SERPL-SCNC: 140 MMOL/L — SIGNIFICANT CHANGE UP (ref 135–145)

## 2018-06-14 PROCEDURE — 99232 SBSQ HOSP IP/OBS MODERATE 35: CPT | Mod: 25

## 2018-06-14 PROCEDURE — 90853 GROUP PSYCHOTHERAPY: CPT

## 2018-06-14 PROCEDURE — 99233 SBSQ HOSP IP/OBS HIGH 50: CPT

## 2018-06-14 RX ORDER — FUROSEMIDE 40 MG
40 TABLET ORAL DAILY
Qty: 0 | Refills: 0 | Status: DISCONTINUED | OUTPATIENT
Start: 2018-06-14 | End: 2018-06-19

## 2018-06-14 RX ADMIN — Medication 50 MILLIGRAM(S): at 06:32

## 2018-06-14 RX ADMIN — AMLODIPINE BESYLATE 10 MILLIGRAM(S): 2.5 TABLET ORAL at 06:32

## 2018-06-14 RX ADMIN — PHENYLEPHRINE-SHARK LIVER OIL-MINERAL OIL-PETROLATUM RECTAL OINTMENT 1 APPLICATION(S): at 21:59

## 2018-06-14 RX ADMIN — APIXABAN 5 MILLIGRAM(S): 2.5 TABLET, FILM COATED ORAL at 06:32

## 2018-06-14 RX ADMIN — Medication 40 MILLIGRAM(S): at 13:23

## 2018-06-14 RX ADMIN — Medication 50 MILLIGRAM(S): at 21:59

## 2018-06-14 RX ADMIN — Medication 50 MILLIGRAM(S): at 13:23

## 2018-06-14 RX ADMIN — ATORVASTATIN CALCIUM 20 MILLIGRAM(S): 80 TABLET, FILM COATED ORAL at 21:59

## 2018-06-14 RX ADMIN — APIXABAN 5 MILLIGRAM(S): 2.5 TABLET, FILM COATED ORAL at 21:59

## 2018-06-14 RX ADMIN — Medication 100 MILLIGRAM(S): at 21:59

## 2018-06-14 RX ADMIN — POLYETHYLENE GLYCOL 3350 17 GRAM(S): 17 POWDER, FOR SOLUTION ORAL at 21:59

## 2018-06-14 RX ADMIN — POLYETHYLENE GLYCOL 3350 17 GRAM(S): 17 POWDER, FOR SOLUTION ORAL at 10:28

## 2018-06-14 RX ADMIN — Medication 75 MICROGRAM(S): at 06:32

## 2018-06-14 RX ADMIN — Medication 100 MILLIGRAM(S): at 06:33

## 2018-06-14 RX ADMIN — ESCITALOPRAM OXALATE 10 MILLIGRAM(S): 10 TABLET, FILM COATED ORAL at 10:28

## 2018-06-15 PROCEDURE — 99232 SBSQ HOSP IP/OBS MODERATE 35: CPT

## 2018-06-15 RX ADMIN — Medication 40 MILLIGRAM(S): at 09:30

## 2018-06-15 RX ADMIN — Medication 75 MICROGRAM(S): at 06:36

## 2018-06-15 RX ADMIN — POLYETHYLENE GLYCOL 3350 17 GRAM(S): 17 POWDER, FOR SOLUTION ORAL at 09:30

## 2018-06-15 RX ADMIN — PHENYLEPHRINE-SHARK LIVER OIL-MINERAL OIL-PETROLATUM RECTAL OINTMENT 1 APPLICATION(S): at 21:47

## 2018-06-15 RX ADMIN — Medication 50 MILLIGRAM(S): at 06:36

## 2018-06-15 RX ADMIN — ATORVASTATIN CALCIUM 20 MILLIGRAM(S): 80 TABLET, FILM COATED ORAL at 21:47

## 2018-06-15 RX ADMIN — AMLODIPINE BESYLATE 10 MILLIGRAM(S): 2.5 TABLET ORAL at 06:36

## 2018-06-15 RX ADMIN — Medication 50 MILLIGRAM(S): at 13:46

## 2018-06-15 RX ADMIN — ESCITALOPRAM OXALATE 10 MILLIGRAM(S): 10 TABLET, FILM COATED ORAL at 09:30

## 2018-06-15 RX ADMIN — Medication 50 MILLIGRAM(S): at 21:47

## 2018-06-15 RX ADMIN — POLYETHYLENE GLYCOL 3350 17 GRAM(S): 17 POWDER, FOR SOLUTION ORAL at 21:47

## 2018-06-15 RX ADMIN — APIXABAN 5 MILLIGRAM(S): 2.5 TABLET, FILM COATED ORAL at 06:36

## 2018-06-15 RX ADMIN — Medication 100 MILLIGRAM(S): at 06:36

## 2018-06-15 RX ADMIN — APIXABAN 5 MILLIGRAM(S): 2.5 TABLET, FILM COATED ORAL at 21:47

## 2018-06-16 RX ADMIN — AMLODIPINE BESYLATE 10 MILLIGRAM(S): 2.5 TABLET ORAL at 06:52

## 2018-06-16 RX ADMIN — PHENYLEPHRINE-SHARK LIVER OIL-MINERAL OIL-PETROLATUM RECTAL OINTMENT 1 APPLICATION(S): at 21:36

## 2018-06-16 RX ADMIN — POLYETHYLENE GLYCOL 3350 17 GRAM(S): 17 POWDER, FOR SOLUTION ORAL at 09:24

## 2018-06-16 RX ADMIN — Medication 50 MILLIGRAM(S): at 13:08

## 2018-06-16 RX ADMIN — Medication 50 MILLIGRAM(S): at 21:36

## 2018-06-16 RX ADMIN — ATORVASTATIN CALCIUM 20 MILLIGRAM(S): 80 TABLET, FILM COATED ORAL at 21:36

## 2018-06-16 RX ADMIN — Medication 75 MICROGRAM(S): at 06:52

## 2018-06-16 RX ADMIN — APIXABAN 5 MILLIGRAM(S): 2.5 TABLET, FILM COATED ORAL at 21:36

## 2018-06-16 RX ADMIN — Medication 40 MILLIGRAM(S): at 09:24

## 2018-06-16 RX ADMIN — Medication 100 MILLIGRAM(S): at 21:36

## 2018-06-16 RX ADMIN — Medication 100 MILLIGRAM(S): at 06:52

## 2018-06-16 RX ADMIN — POLYETHYLENE GLYCOL 3350 17 GRAM(S): 17 POWDER, FOR SOLUTION ORAL at 21:36

## 2018-06-16 RX ADMIN — APIXABAN 5 MILLIGRAM(S): 2.5 TABLET, FILM COATED ORAL at 06:52

## 2018-06-16 RX ADMIN — Medication 50 MILLIGRAM(S): at 06:52

## 2018-06-16 RX ADMIN — ESCITALOPRAM OXALATE 10 MILLIGRAM(S): 10 TABLET, FILM COATED ORAL at 09:24

## 2018-06-17 RX ADMIN — POLYETHYLENE GLYCOL 3350 17 GRAM(S): 17 POWDER, FOR SOLUTION ORAL at 10:30

## 2018-06-17 RX ADMIN — APIXABAN 5 MILLIGRAM(S): 2.5 TABLET, FILM COATED ORAL at 07:29

## 2018-06-17 RX ADMIN — Medication 40 MILLIGRAM(S): at 09:21

## 2018-06-17 RX ADMIN — Medication 50 MILLIGRAM(S): at 21:51

## 2018-06-17 RX ADMIN — POLYETHYLENE GLYCOL 3350 17 GRAM(S): 17 POWDER, FOR SOLUTION ORAL at 21:51

## 2018-06-17 RX ADMIN — Medication 75 MICROGRAM(S): at 07:29

## 2018-06-17 RX ADMIN — ATORVASTATIN CALCIUM 20 MILLIGRAM(S): 80 TABLET, FILM COATED ORAL at 21:51

## 2018-06-17 RX ADMIN — ESCITALOPRAM OXALATE 10 MILLIGRAM(S): 10 TABLET, FILM COATED ORAL at 09:21

## 2018-06-17 RX ADMIN — PHENYLEPHRINE-SHARK LIVER OIL-MINERAL OIL-PETROLATUM RECTAL OINTMENT 1 APPLICATION(S): at 21:51

## 2018-06-17 RX ADMIN — AMLODIPINE BESYLATE 10 MILLIGRAM(S): 2.5 TABLET ORAL at 07:29

## 2018-06-17 RX ADMIN — Medication 50 MILLIGRAM(S): at 13:22

## 2018-06-17 RX ADMIN — Medication 100 MILLIGRAM(S): at 07:29

## 2018-06-17 RX ADMIN — Medication 100 MILLIGRAM(S): at 21:51

## 2018-06-17 RX ADMIN — APIXABAN 5 MILLIGRAM(S): 2.5 TABLET, FILM COATED ORAL at 21:51

## 2018-06-17 RX ADMIN — Medication 50 MILLIGRAM(S): at 07:29

## 2018-06-18 PROCEDURE — 99233 SBSQ HOSP IP/OBS HIGH 50: CPT

## 2018-06-18 PROCEDURE — 99232 SBSQ HOSP IP/OBS MODERATE 35: CPT

## 2018-06-18 RX ORDER — PHENYLEPHRINE-SHARK LIVER OIL-MINERAL OIL-PETROLATUM RECTAL OINTMENT
1 OINTMENT (GRAM) RECTAL
Qty: 0 | Refills: 0 | COMMUNITY
Start: 2018-06-18

## 2018-06-18 RX ORDER — HYDRALAZINE HCL 50 MG
1 TABLET ORAL
Qty: 0 | Refills: 0 | COMMUNITY
Start: 2018-06-18

## 2018-06-18 RX ORDER — ATORVASTATIN CALCIUM 80 MG/1
1 TABLET, FILM COATED ORAL
Qty: 0 | Refills: 0 | COMMUNITY
Start: 2018-06-18

## 2018-06-18 RX ORDER — DIVALPROEX SODIUM 500 MG/1
1 TABLET, DELAYED RELEASE ORAL
Qty: 0 | Refills: 0 | COMMUNITY
Start: 2018-06-18

## 2018-06-18 RX ORDER — APIXABAN 2.5 MG/1
1 TABLET, FILM COATED ORAL
Qty: 0 | Refills: 0 | COMMUNITY
Start: 2018-06-18

## 2018-06-18 RX ORDER — METOPROLOL TARTRATE 50 MG
1 TABLET ORAL
Qty: 0 | Refills: 0 | COMMUNITY
Start: 2018-06-18

## 2018-06-18 RX ORDER — DIVALPROEX SODIUM 500 MG/1
500 TABLET, DELAYED RELEASE ORAL AT BEDTIME
Qty: 0 | Refills: 0 | Status: DISCONTINUED | OUTPATIENT
Start: 2018-06-18 | End: 2018-06-19

## 2018-06-18 RX ORDER — LEVOTHYROXINE SODIUM 125 MCG
1 TABLET ORAL
Qty: 0 | Refills: 0 | COMMUNITY
Start: 2018-06-18

## 2018-06-18 RX ORDER — FUROSEMIDE 40 MG
1 TABLET ORAL
Qty: 0 | Refills: 0 | COMMUNITY
Start: 2018-06-18

## 2018-06-18 RX ORDER — POLYETHYLENE GLYCOL 3350 17 G/17G
17 POWDER, FOR SOLUTION ORAL
Qty: 0 | Refills: 0 | COMMUNITY
Start: 2018-06-18

## 2018-06-18 RX ORDER — ESCITALOPRAM OXALATE 10 MG/1
1 TABLET, FILM COATED ORAL
Qty: 0 | Refills: 0 | COMMUNITY
Start: 2018-06-18

## 2018-06-18 RX ORDER — AMLODIPINE BESYLATE 2.5 MG/1
1 TABLET ORAL
Qty: 0 | Refills: 0 | COMMUNITY
Start: 2018-06-18

## 2018-06-18 RX ADMIN — APIXABAN 5 MILLIGRAM(S): 2.5 TABLET, FILM COATED ORAL at 22:30

## 2018-06-18 RX ADMIN — Medication 50 MILLIGRAM(S): at 07:05

## 2018-06-18 RX ADMIN — Medication 50 MILLIGRAM(S): at 22:30

## 2018-06-18 RX ADMIN — Medication 100 MILLIGRAM(S): at 07:05

## 2018-06-18 RX ADMIN — Medication 75 MICROGRAM(S): at 07:05

## 2018-06-18 RX ADMIN — ESCITALOPRAM OXALATE 10 MILLIGRAM(S): 10 TABLET, FILM COATED ORAL at 08:26

## 2018-06-18 RX ADMIN — AMLODIPINE BESYLATE 10 MILLIGRAM(S): 2.5 TABLET ORAL at 07:05

## 2018-06-18 RX ADMIN — Medication 50 MILLIGRAM(S): at 13:11

## 2018-06-18 RX ADMIN — Medication 100 MILLIGRAM(S): at 22:30

## 2018-06-18 RX ADMIN — PHENYLEPHRINE-SHARK LIVER OIL-MINERAL OIL-PETROLATUM RECTAL OINTMENT 1 APPLICATION(S): at 22:30

## 2018-06-18 RX ADMIN — POLYETHYLENE GLYCOL 3350 17 GRAM(S): 17 POWDER, FOR SOLUTION ORAL at 09:25

## 2018-06-18 RX ADMIN — ATORVASTATIN CALCIUM 20 MILLIGRAM(S): 80 TABLET, FILM COATED ORAL at 22:30

## 2018-06-18 RX ADMIN — APIXABAN 5 MILLIGRAM(S): 2.5 TABLET, FILM COATED ORAL at 07:05

## 2018-06-18 RX ADMIN — DIVALPROEX SODIUM 500 MILLIGRAM(S): 500 TABLET, DELAYED RELEASE ORAL at 22:30

## 2018-06-18 RX ADMIN — Medication 40 MILLIGRAM(S): at 08:26

## 2018-06-18 RX ADMIN — POLYETHYLENE GLYCOL 3350 17 GRAM(S): 17 POWDER, FOR SOLUTION ORAL at 22:30

## 2018-06-19 VITALS — TEMPERATURE: 98 F

## 2018-06-19 LAB
BUN SERPL-MCNC: 32 MG/DL — HIGH (ref 7–23)
CALCIUM SERPL-MCNC: 9.3 MG/DL — SIGNIFICANT CHANGE UP (ref 8.4–10.5)
CHLORIDE SERPL-SCNC: 99 MMOL/L — SIGNIFICANT CHANGE UP (ref 98–107)
CO2 SERPL-SCNC: 28 MMOL/L — SIGNIFICANT CHANGE UP (ref 22–31)
CREAT SERPL-MCNC: 1.96 MG/DL — HIGH (ref 0.5–1.3)
GLUCOSE SERPL-MCNC: 104 MG/DL — HIGH (ref 70–99)
POTASSIUM SERPL-MCNC: 4 MMOL/L — SIGNIFICANT CHANGE UP (ref 3.5–5.3)
POTASSIUM SERPL-SCNC: 4 MMOL/L — SIGNIFICANT CHANGE UP (ref 3.5–5.3)
SODIUM SERPL-SCNC: 140 MMOL/L — SIGNIFICANT CHANGE UP (ref 135–145)

## 2018-06-19 PROCEDURE — 99238 HOSP IP/OBS DSCHRG MGMT 30/<: CPT

## 2018-06-19 PROCEDURE — 99233 SBSQ HOSP IP/OBS HIGH 50: CPT

## 2018-06-19 RX ADMIN — AMLODIPINE BESYLATE 10 MILLIGRAM(S): 2.5 TABLET ORAL at 07:13

## 2018-06-19 RX ADMIN — Medication 50 MILLIGRAM(S): at 13:29

## 2018-06-19 RX ADMIN — Medication 40 MILLIGRAM(S): at 09:40

## 2018-06-19 RX ADMIN — Medication 50 MILLIGRAM(S): at 07:13

## 2018-06-19 RX ADMIN — Medication 75 MICROGRAM(S): at 07:13

## 2018-06-19 RX ADMIN — APIXABAN 5 MILLIGRAM(S): 2.5 TABLET, FILM COATED ORAL at 07:13

## 2018-06-19 RX ADMIN — POLYETHYLENE GLYCOL 3350 17 GRAM(S): 17 POWDER, FOR SOLUTION ORAL at 09:40

## 2018-06-19 RX ADMIN — ESCITALOPRAM OXALATE 10 MILLIGRAM(S): 10 TABLET, FILM COATED ORAL at 09:40

## 2018-06-19 RX ADMIN — Medication 100 MILLIGRAM(S): at 07:13

## 2018-06-19 NOTE — PROGRESS NOTE ADULT - SUBJECTIVE AND OBJECTIVE BOX
CC/Reason for Consult:  F/u on ANTOINETTE on CKD and constipation.     SUBJECTIVE / OVERNIGHT EVENTS:  Patient states that her oral intake is still poor. She denies decreased urine output or difficulty with urination. She does not remember when her last BM was. Denies diarrhea or straining when having a BM     MEDICATIONS  (STANDING):  amLODIPine   Tablet 5 milliGRAM(s) Oral <User Schedule>  apixaban 5 milliGRAM(s) Oral <User Schedule>  atorvastatin 20 milliGRAM(s) Oral at bedtime  escitalopram 10 milliGRAM(s) Oral daily  hemorrhoidal Ointment 1 Application(s) Rectal at bedtime  hydrALAZINE 50 milliGRAM(s) Oral <User Schedule>  levothyroxine 75 MICROGram(s) Oral <User Schedule>  metoprolol tartrate 100 milliGRAM(s) Oral <User Schedule>  polyethylene glycol 3350 17 Gram(s) Oral two times a day    MEDICATIONS  (PRN):  sodium biphosphate Rectal Enema 1 Enema Rectal daily PRN constipation        CAPILLARY BLOOD GLUCOSE            PHYSICAL EXAM:  Vital Signs Last 24 Hrs  T(C): 37.1 (30 May 2018 08:40), Max: 37.2 (30 May 2018 06:29)  T(F): 98.7 (30 May 2018 08:40), Max: 99 (30 May 2018 06:29)  HR: 69 (30 May 2018 09:05) (59 - 69)  BP: 141/65 (30 May 2018 09:05) (141/52 - 141/65)  BP(mean): 82 (30 May 2018 09:05) (75 - 82)  RR: 16 (30 May 2018 09:05) (15 - 17)  SpO2: 95% (30 May 2018 09:05) (95% - 97%)  GENERAL: NAD  HEAD:  Atraumatic, Normocephalic  EYES: EOMI,  conjunctiva and sclera clear  NECK: Supple,  CHEST/LUNG: Clear to auscultation bilaterally; No wheeze  HEART: Regular rate and rhythm; No murmurs, rubs, or gallops  ABDOMEN: Soft, Nontender, Nondistended; Bowel sounds present  EXTREMITIES:  2+ Peripheral Pulses, No clubbing, cyanosis, or edema  PSYCH: AAOx2 (Knows name, knows where she is. Does not know date)  NEUROLOGY: non-focal  SKIN: No rashes or lesions    LABS:                    RADIOLOGY & ADDITIONAL TESTS:    Imaging Personally Reviewed:    Consultant(s) Notes Reviewed:      Care Discussed with Consultants/Other Providers:
Patient is a 77y old  Female who presents with a chief complaint of       SUBJECTIVE / OVERNIGHT EVENTS:  no acute events o/n  pt seen at lunch table  pt initally asleep with head down, but easily awakens  she denies all complaints  no cp/sob  R 4th digit no longer painful     MEDICATIONS  (STANDING):  amLODIPine   Tablet 5 milliGRAM(s) Oral <User Schedule>  apixaban 5 milliGRAM(s) Oral <User Schedule>  atorvastatin 20 milliGRAM(s) Oral at bedtime  escitalopram 10 milliGRAM(s) Oral daily  hemorrhoidal Ointment 1 Application(s) Rectal at bedtime  hydrALAZINE 50 milliGRAM(s) Oral <User Schedule>  levothyroxine 75 MICROGram(s) Oral <User Schedule>  metoprolol tartrate 100 milliGRAM(s) Oral <User Schedule>  polyethylene glycol 3350 17 Gram(s) Oral two times a day    MEDICATIONS  (PRN):  sodium biphosphate Rectal Enema 1 Enema Rectal daily PRN constipation      Vital Signs Last 24 Hrs  T(C): 36.8 (04 Jun 2018 06:47), Max: 37.1 (03 Jun 2018 15:45)  T(F): 98.2 (04 Jun 2018 06:47), Max: 98.7 (03 Jun 2018 15:45)  HR: 63 (04 Jun 2018 11:59) (60 - 64)  BP: 145/69 (04 Jun 2018 11:59) (138/53 - 175/72)  BP(mean): 60 (03 Jun 2018 20:33) (60 - 60)  RR: --  SpO2: --  CAPILLARY BLOOD GLUCOSE        I&O's Summary        PHYSICAL EXAM    GENERAL: NAD, well-developed  NECK: Supple, No JVD  CHEST/LUNG: Clear to auscultation bilaterally; No wheeze  HEART: Regular rate and rhythm; No murmurs, rubs, or gallops  ABDOMEN: Soft, Nontender, Nondistended; Bowel sounds present  EXTREMITIES:  R 4th finger - no erythema or edema, arthritic changes all fingers of b/l hands     LABS:                    RADIOLOGY & ADDITIONAL TESTS:    Imaging Personally Reviewed:  Consultant(s) Notes Reviewed:    Care Discussed with Consultants/Other Providers:
Patient is a 77y old  Female who presents with a chief complaint of     SUBJECTIVE / OVERNIGHT EVENTS:  Pt states she drank a lot of water yesterday.  She denies pain.  She does feel constipated - unable to say when last BM was.  + passing gas; no n/v.      MEDICATIONS  (STANDING):  amLODIPine   Tablet 5 milliGRAM(s) Oral <User Schedule>  apixaban 5 milliGRAM(s) Oral <User Schedule>  atorvastatin 20 milliGRAM(s) Oral at bedtime  escitalopram 10 milliGRAM(s) Oral daily  hemorrhoidal Ointment 1 Application(s) Rectal at bedtime  hydrALAZINE 50 milliGRAM(s) Oral <User Schedule>  levothyroxine 75 MICROGram(s) Oral <User Schedule>  metoprolol tartrate 100 milliGRAM(s) Oral <User Schedule>  polyethylene glycol 3350 17 Gram(s) Oral two times a day    MEDICATIONS  (PRN):  sodium biphosphate Rectal Enema 1 Enema Rectal daily PRN constipation      Vital Signs Last 24 Hrs  T(C): 36.8 (25 May 2018 06:18), Max: 37.1 (24 May 2018 15:26)  T(F): 98.3 (25 May 2018 06:18), Max: 98.7 (24 May 2018 15:26)  HR: 71 (25 May 2018 09:41) (71 - 71)  BP: 162/56 (25 May 2018 09:41) (162/56 - 162/56)  BP(mean): --  RR: 13 (25 May 2018 09:41) (13 - 13)  SpO2: 97% (25 May 2018 09:41) (97% - 97%)  CAPILLARY BLOOD GLUCOSE    PHYSICAL EXAM:  GENERAL: NAD, well-developed  HEAD:  Atraumatic, Normocephalic  EYES: EOMI, conjunctiva and sclera clear  NECK: Supple, No JVD  CHEST/LUNG: Clear to auscultation bilaterally; No wheeze  HEART: Regular rate and rhythm; No murmurs  ABDOMEN: Soft, Nontender, Nondistended; Bowel sounds present  EXTREMITIES:  2+ Peripheral Pulses, No edema  PSYCH: AAOx1 (self)  NEUROLOGY: non-focal  SKIN: No rashes or lesions    LABS:    05-24    139  |  99  |  63<H>  ----------------------------<  221<H>  3.7   |  27  |  2.46<H>    Ca    9.3      24 May 2018 09:02
Patient is a 77y old  Female who presents with a chief complaint of     SUBJECTIVE / OVERNIGHT EVENTS:  pt found to have ANTOINETTE on routine labs.  As per patient and daughter the patient does not drink enough water or other liquids.  She denies dysuria, flank pain or hesitancy.  She states the color of the urine is a bit darker than usual.  She received a 500 cc bolus IVF yesterday during her ECT - her Cr has improved mildly.      MEDICATIONS  (STANDING):  amLODIPine   Tablet 5 milliGRAM(s) Oral <User Schedule>  apixaban 5 milliGRAM(s) Oral <User Schedule>  atorvastatin 20 milliGRAM(s) Oral at bedtime  escitalopram 10 milliGRAM(s) Oral daily  hydrALAZINE 50 milliGRAM(s) Oral <User Schedule>  levothyroxine 75 MICROGram(s) Oral <User Schedule>  metoprolol tartrate 100 milliGRAM(s) Oral <User Schedule>    MEDICATIONS  (PRN):      Vital Signs Last 24 Hrs  T(C): 36.9 (24 May 2018 06:19), Max: 36.9 (24 May 2018 06:19)  T(F): 98.4 (24 May 2018 06:19), Max: 98.4 (24 May 2018 06:19)  HR: 62 (23 May 2018 14:11) (62 - 63)  BP: 130/70 (23 May 2018 14:11) (130/70 - 158/51)  BP(mean): --  RR: 18 (24 May 2018 06:19) (11 - 18)  SpO2: 98% (23 May 2018 12:55) (98% - 98%)  CAPILLARY BLOOD GLUCOSE    PHYSICAL EXAM:  GENERAL: NAD, well-developed  HEAD:  Atraumatic, Normocephalic  EYES: EOMI, conjunctiva and sclera clear  NECK: Supple, No JVD  CHEST/LUNG: Clear to auscultation bilaterally; No wheeze  HEART: Regular rate and rhythm; No murmurs  ABDOMEN: Soft, Nontender, Nondistended; Bowel sounds present  EXTREMITIES:  2+ Peripheral Pulses, No edema  PSYCH: AAOx3  NEUROLOGY: non-focal  SKIN: No rashes or lesions    LABS:                        9.3    9.11  )-----------( 236      ( 23 May 2018 09:33 )             29.1     05-24    139  |  99  |  63<H>  ----------------------------<  221<H>  3.7   |  27  |  2.46<H>    Ca    9.3      24 May 2018 09:02
Patient is a 77y old  Female who presents with a chief complaint of ANTOINETTE    SUBJECTIVE / OVERNIGHT EVENTS: no events feels well     ROS:  No HA/DZ  No Vision changes   No CP, SOB  No N/V/D  No Edema  No Rash  NO weakness, numbness    MEDICATIONS  (STANDING):  amLODIPine   Tablet 10 milliGRAM(s) Oral <User Schedule>  apixaban 5 milliGRAM(s) Oral <User Schedule>  atorvastatin 20 milliGRAM(s) Oral at bedtime  escitalopram 10 milliGRAM(s) Oral daily  hemorrhoidal Ointment 1 Application(s) Rectal at bedtime  hydrALAZINE 50 milliGRAM(s) Oral <User Schedule>  levothyroxine 75 MICROGram(s) Oral <User Schedule>  metoprolol tartrate 100 milliGRAM(s) Oral <User Schedule>  polyethylene glycol 3350 17 Gram(s) Oral two times a day    MEDICATIONS  (PRN):  sodium biphosphate Rectal Enema 1 Enema Rectal daily PRN constipation      T(C): 37.4 (06-14-18 @ 06:38)  HR: 56 (06-13-18 @ 16:33)  BP: 144/46 (06-13-18 @ 16:33)  RR: 17 (06-13-18 @ 20:24)  SpO2: 96% (06-13-18 @ 16:33)  CAPILLARY BLOOD GLUCOSE        I&O's Summary      PHYSICAL EXAM:  GENERAL: NAD, well-developed,   HEAD:  Atraumatic, Normocephalic  EYES: EOMI, PERRL, conjunctiva and sclera clear  NECK: Supple, No JVD  CHEST/LUNG: Clear to auscultation bilaterally  HEART: Regular rate and rhythm; No murmurs, rubs, or gallops, No Edema  ABDOMEN: Soft, Nontender, Nondistended; Bowel sounds present  EXTREMITIES:  2+ Peripheral Pulses, No clubbing, cyanosis  PSYCH: No SI/HI  NEUROLOGY: non-focal  SKIN: No rashes or lesions    LABS:    06-14    140  |  102  |  32<H>  ----------------------------<  154<H>  4.7   |  23  |  1.64<H>    Ca    9.0      14 Jun 2018 08:50                    RADIOLOGY & ADDITIONAL TESTS:    Imaging Personally Reviewed:    Consultant(s) Notes Reviewed:      Care Discussed with Consultants/Other Providers: Psych - Dr Amador
Patient is a 77y old  Female who presents with a chief complaint of HTN, CHF    SUBJECTIVE / OVERNIGHT EVENTS: no events, feels well     ROS:  No HA/DZ  No Vision changes   No CP, SOB  No N/V/D  No Edema  No Rash  NO weakness, numbness    MEDICATIONS  (STANDING):  amLODIPine   Tablet 10 milliGRAM(s) Oral <User Schedule>  apixaban 5 milliGRAM(s) Oral <User Schedule>  atorvastatin 20 milliGRAM(s) Oral at bedtime  escitalopram 10 milliGRAM(s) Oral daily  furosemide    Tablet 40 milliGRAM(s) Oral daily  hemorrhoidal Ointment 1 Application(s) Rectal at bedtime  hydrALAZINE 50 milliGRAM(s) Oral <User Schedule>  levothyroxine 75 MICROGram(s) Oral <User Schedule>  metoprolol tartrate 100 milliGRAM(s) Oral <User Schedule>  polyethylene glycol 3350 17 Gram(s) Oral two times a day    MEDICATIONS  (PRN):  sodium biphosphate Rectal Enema 1 Enema Rectal daily PRN constipation      T(C): 37.4 (06-18-18 @ 06:52)  HR: --59  BP: --161/60  RR: --12  SpO2: --  CAPILLARY BLOOD GLUCOSE        I&O's Summary      PHYSICAL EXAM:  GENERAL: NAD, well-developed, AOx3  HEAD:  Atraumatic, Normocephalic  EYES: EOMI, PERRL, conjunctiva and sclera clear  NECK: Supple, No JVD  CHEST/LUNG: Clear to auscultation bilaterally  HEART: Regular rate and rhythm; No murmurs, rubs, or gallops, No Edema  ABDOMEN: Soft, Nontender, Nondistended; Bowel sounds present  EXTREMITIES:  2+ Peripheral Pulses, No clubbing, cyanosis  PSYCH: No SI/HI  NEUROLOGY: non-focal  SKIN: No rashes or lesions    LABS:        Cr 1.64        RADIOLOGY & ADDITIONAL TESTS:    Imaging Personally Reviewed:    Consultant(s) Notes Reviewed:      Care Discussed with Consultants/Other Providers: Psych - Dr Amador
Patient is a 77y old  Female who presents with a chief complaint of uncontrolled HTN    SUBJECTIVE / OVERNIGHT EVENTS: no events     ROS:  No HA/DZ  No Vision changes   No CP, SOB  No N/V/D  No Edema  No Rash  NO weakness, numbness    MEDICATIONS  (STANDING):  amLODIPine   Tablet 5 milliGRAM(s) Oral <User Schedule>  apixaban 5 milliGRAM(s) Oral <User Schedule>  atorvastatin 20 milliGRAM(s) Oral at bedtime  escitalopram 10 milliGRAM(s) Oral daily  hemorrhoidal Ointment 1 Application(s) Rectal at bedtime  hydrALAZINE 50 milliGRAM(s) Oral <User Schedule>  levothyroxine 75 MICROGram(s) Oral <User Schedule>  metoprolol tartrate 100 milliGRAM(s) Oral <User Schedule>  polyethylene glycol 3350 17 Gram(s) Oral two times a day    MEDICATIONS  (PRN):  sodium biphosphate Rectal Enema 1 Enema Rectal daily PRN constipation      T(C): 36.5 (06-11-18 @ 06:33)  HR: --  BP: --  RR: 16 (06-11-18 @ 06:33)  SpO2: --  CAPILLARY BLOOD GLUCOSE        I&O's Summary      PHYSICAL EXAM:  GENERAL: NAD, well-developed,   HEAD:  Atraumatic, Normocephalic  EYES: EOMI, PERRL, conjunctiva and sclera clear  NECK: Supple, No JVD  CHEST/LUNG: Clear to auscultation bilaterally  HEART: Regular rate and rhythm; No murmurs, rubs, or gallops, No Edema  ABDOMEN: Soft, Nontender, Nondistended; Bowel sounds present  EXTREMITIES:  2+ Peripheral Pulses, No clubbing, cyanosis  PSYCH: No SI/HI  NEUROLOGY: non-focal  SKIN: No rashes or lesions    LABS:        CR 1.77      RADIOLOGY & ADDITIONAL TESTS:    Imaging Personally Reviewed: x-ray hand - OA     Consultant(s) Notes Reviewed:      Care Discussed with Consultants/Other Providers: Psych - Dr Gill
Patient is a 77y old  Female who presents with a chief complaint of CHF    SUBJECTIVE / OVERNIGHT EVENTS: no events, feels well    ROS:  No HA/DZ  No Vision changes   No CP, SOB  No N/V/D  No Edema  No Rash  NO weakness, numbness    MEDICATIONS  (STANDING):  amLODIPine   Tablet 10 milliGRAM(s) Oral <User Schedule>  apixaban 5 milliGRAM(s) Oral <User Schedule>  atorvastatin 20 milliGRAM(s) Oral at bedtime  diVALproex  milliGRAM(s) Oral at bedtime  escitalopram 10 milliGRAM(s) Oral daily  furosemide    Tablet 40 milliGRAM(s) Oral daily  hemorrhoidal Ointment 1 Application(s) Rectal at bedtime  hydrALAZINE 50 milliGRAM(s) Oral <User Schedule>  levothyroxine 75 MICROGram(s) Oral <User Schedule>  metoprolol tartrate 100 milliGRAM(s) Oral <User Schedule>  polyethylene glycol 3350 17 Gram(s) Oral two times a day    MEDICATIONS  (PRN):  sodium biphosphate Rectal Enema 1 Enema Rectal daily PRN constipation      T(C): 36.9 (06-19-18 @ 07:45)  HR: 58 (06-18-18 @ 22:06)  BP: 130/50 (06-18-18 @ 22:06)  RR: 18 (06-18-18 @ 22:06)  SpO2: --  CAPILLARY BLOOD GLUCOSE        I&O's Summary      PHYSICAL EXAM:  GENERAL: NAD, well-developed, AOx1-2  HEAD:  Atraumatic, Normocephalic  EYES: EOMI, PERRL, conjunctiva and sclera clear  NECK: Supple, No JVD  CHEST/LUNG: Clear to auscultation bilaterally  HEART: Regular rate and rhythm; No murmurs, rubs, or gallops, No Edema  ABDOMEN: Soft, Nontender, Nondistended; Bowel sounds present  EXTREMITIES:  2+ Peripheral Pulses, No clubbing, cyanosis  PSYCH: No SI/HI  NEUROLOGY: non-focal  SKIN: No rashes or lesions    LABS:    06-19    140  |  99  |  32<H>  ----------------------------<  104<H>  4.0   |  28  |  1.96<H>    Ca    9.3      19 Jun 2018 08:45                    RADIOLOGY & ADDITIONAL TESTS:    Imaging Personally Reviewed:    Consultant(s) Notes Reviewed:      Care Discussed with Consultants/Other Providers: [place] - Dr Amador
CC/Reason for Consult: Called to evaluate R 4th digit swelling     SUBJECTIVE / OVERNIGHT EVENTS: Pt states that she does not know when the finger became swollen. She is unclear if the hand/finger experienced trauma or was bumped somewhere. She only reports pain with palpation and sometimes with movement. She denies fevers, chills, bruises, open sore/wound or similar features in other finger joints. She does report having a hx of arthritis. She is uncertain of the type. But states that she usually has DIP pain in several fingers. She also experiences morning stiffness that improves after a while.     MEDICATIONS  (STANDING):  amLODIPine   Tablet 5 milliGRAM(s) Oral <User Schedule>  apixaban 5 milliGRAM(s) Oral <User Schedule>  atorvastatin 20 milliGRAM(s) Oral at bedtime  escitalopram 10 milliGRAM(s) Oral daily  hemorrhoidal Ointment 1 Application(s) Rectal at bedtime  hydrALAZINE 50 milliGRAM(s) Oral <User Schedule>  levothyroxine 75 MICROGram(s) Oral <User Schedule>  metoprolol tartrate 100 milliGRAM(s) Oral <User Schedule>  polyethylene glycol 3350 17 Gram(s) Oral two times a day    MEDICATIONS  (PRN):  sodium biphosphate Rectal Enema 1 Enema Rectal daily PRN constipation        CAPILLARY BLOOD GLUCOSE    PHYSICAL EXAM:  Vital Signs Last 24 Hrs  T(C): 36.9 (31 May 2018 06:14), Max: 36.9 (31 May 2018 06:14)  T(F): 98.4 (31 May 2018 06:14), Max: 98.4 (31 May 2018 06:14)  HR: --64(31 May 2018 06:14  BP: --161/68(31 May 2018 06:14  BP(mean): --  RR: 17 (31 May 2018 06:14) (17 - 17)  SpO2: --  GENERAL: NAD  HEAD:  Atraumatic, Normocephalic  EYES: EOMI,  conjunctiva and sclera clear  NECK: Supple,  CHEST/LUNG: Clear to auscultation bilaterally; No wheeze  HEART: Regular rate and rhythm; No murmurs, rubs, or gallops  ABDOMEN: Soft, Nontender, Nondistended; Bowel sounds present  EXTREMITIES:  2+ Peripheral Pulses, No clubbing, cyanosis, or edema  PSYCH: AAOx2 (Knows name, knows where she is. Does not know date)  NEUROLOGY: non-focal  SKIN: R 4th Finger more swollen w/ mild erythema compared to the L. No open sores, wounds, or cuts appreciated. +TTP to palpation of the joint. Able to move joint passively but pt states it is more painful.      LABS:    05-31    141  |  102  |  53<H>  ----------------------------<  152<H>  4.5   |  26  |  2.03<H>    Ca    9.2      31 May 2018 08:30                RADIOLOGY & ADDITIONAL TESTS:    Imaging Personally Reviewed:    Consultant(s) Notes Reviewed:      Care Discussed with Consultants/Other Providers:

## 2018-06-19 NOTE — PROGRESS NOTE ADULT - PROBLEM SELECTOR PLAN 2
On chronic renal failure (Creatinine as low as 1.54 in 2/2018 and was 1.93 on admission). Suspect 2/2  prerenal causes. Creatine levels continue to improve, which was noted on todays labs   -continue to encourage oral hydration
- holding lasix at this time  - would resume when homero resolves  - consider doing every other day dosing
- holding lasix at this time  - would resume when homero resolves  - consider doing every other day dosing
Patient not in volume overload at this time  - monitor volume status   - holding lasix at this time  - Will likely resume when homero resolves and pt w/ stable oral intake
Patient not in volume overload at this time  - monitor volume status   - holding lasix at this time - if repeat BMP stable or improved cr, will resume lasix   - Will likely resume when homero resolves and pt w/ stable oral intake
Patient not in volume overload at this time  - monitor volume status   - resume home maintenance Lasix   - Will likely resume when homero resolves and pt w/ stable oral intake
Patient not in volume overload at this time  - monitor volume status   - resumed home maintenance Lasix
Patient not in volume overload at this tme  -monitor volume status   - Holding lasix at this time  - Will likely resume when homero resolves and pt w/ stable oral intake   - consider doing every other day dosing
Patient not in volume overload at this time  - monitor volume status   - resumed home maintenance Lasix

## 2018-06-19 NOTE — PROGRESS NOTE ADULT - ASSESSMENT
77F with Cad/cabg, CKD (as low as 1,54 in february. Admitted with cr level of 1.93), hypothyroidism, HTN, cva, depression requiring ECT w/ course c/b  ANTOINETTE which is currently improving and pt now w/ swollen R 4th DIP joint w/ TTP.
77F with Cad/cabg, CKD (as low as 1,54 in february. Admitted with cr level of 1.93), hypothyroidism, HTN, cva, depression requiring ECT now with ANTOINETTE.
77F with Cad/cabg, CKD (as low as 1,54 in february. Admitted with cr level of 1.93), hypothyroidism, HTN, cva, depression requiring ECT w/ course c/b  ANTOINETTE which is currently improving
77F with Cad/cabg, chronic diastolic CHF, CKD (as low as 1,54 in february. Admitted with cr level of 1.93), hypothyroidism, HTN, cva, depression requiring ECT w/ course c/b  ANTOINETTE which is currently improving
77F with Cad/cabg, chronic diastolic CHF, CKD3(as low as 1.54 in february. Admitted with cr level of 1.93), hypothyroidism, HTN, CVA, depression requiring ECT w/ course c/b  ANTOINETTE which is currently improving
77F with Cad/cabg, chronic diastolic CHF, CKD3(as low as 1.54 in february. Admitted with cr level of 1.93), hypothyroidism, HTN, CVA, depression requiring ECT w/ course c/b  ANTOINETTE which is currently improving
77F with cad/cabg, ckd, hypothyroidism, htn, cva, depression requiring ECT now with ANTOINETTE.
77F with cad/cabg, ckd, hypothyroidism, htn, cva, depression requiring ECT now with ANTOINETTE.
77F with Cad/cabg, chronic diastolic CHF, CKD3(as low as 1.54 in february. Admitted with cr level of 1.93), hypothyroidism, HTN, CVA, depression requiring ECT w/ course c/b  ANTOINETTE which is currently improving

## 2018-06-19 NOTE — PROGRESS NOTE ADULT - PROBLEM SELECTOR PLAN 4
- encourage po hydration with water  - miralax daily
Continue w/ statin
For rate control c/w metoprolol   For a/c c/w eliquis
c/w metoprolol   c/w eliquis

## 2018-06-19 NOTE — PROGRESS NOTE ADULT - PROBLEM SELECTOR PROBLEM 4
Coronary artery disease
Atrial fibrillation
Constipation, unspecified constipation type
R/O Atrial fibrillation
Atrial fibrillation

## 2018-06-19 NOTE — PROGRESS NOTE ADULT - PROBLEM SELECTOR PROBLEM 1
R/O Swollen joint
ANTOINETTE (acute kidney injury)

## 2018-06-19 NOTE — PROGRESS NOTE ADULT - PROVIDER SPECIALTY LIST ADULT
Hospitalist
Internal Medicine
Internal Medicine
Hospitalist

## 2018-06-19 NOTE — PROGRESS NOTE ADULT - PROBLEM SELECTOR PROBLEM 3
Chronic diastolic congestive heart failure
Coronary artery disease
Hypothyroidism, unspecified type
Hypothyroidism, unspecified type
Coronary artery disease

## 2018-06-19 NOTE — PROGRESS NOTE ADULT - PROBLEM SELECTOR PLAN 6
- c/w current bowel regimen  -monitor BMs
- c/w current bowel regimen  -monitor BMs
- encourage po hydration with water  - c/w current bowel regimen  -monitor BMs
BP increased to the 160s systolic today but isolated.   - continue to monitor BP  -c/w amlodipine, hydralazine, metoprolol

## 2018-06-19 NOTE — PROGRESS NOTE ADULT - PROBLEM SELECTOR PROBLEM 2
ANTOINETTE (acute kidney injury)
Chronic diastolic congestive heart failure

## 2018-06-19 NOTE — PROGRESS NOTE ADULT - PROBLEM SELECTOR PLAN 1
Unclear etiology at this time. Could be 2/2 trauma vs arthritis.   -would start with xray to evaluate for fracture and joint preservation  -Pt only has pain w/ palpation. If worsening pain can start Tylenol PRN.  -continue to monitor
- acute on chronic renal failure  - likely due to prerenal causes  - d/c lasix and encourage oral hydration  - would check another bmp in the am; if Cr still elevated then would do another 500 cc bolus at ECT.
- acute on chronic renal failure  - likely due to prerenal causes  - d/c lasix and encourage oral hydration  - would check another bmp in the am; if Cr still elevated then would do another 500 cc bolus at ECT.
On CKD 3 (Creatinine as low as 1.54 in 2/2018 and was 1.93 on admission). Suspect 2/2  prerenal causes. Creatine levels continue to improve  -continue to encourage oral hydration  -repeat BMP with improved renal fx and close to baseline now - will resume home diuretic
On CKD 3 (Creatinine as low as 1.54 in 2/2018 and was 1.93 on admission). Suspect 2/2  prerenal causes. Creatine levels continue to improve  -repeat BMP with improved renal fx and close to baseline now - resumed home diuretic - will repeat BMP in AM to assure stable on diuretic
On chronic renal failure (Creatinine as low as 1.54 in 2/2018 and was 1.93 on admission). Suspect 2/2  prerenal causes. Creatine levels continue to improve  -continue to encourage oral hydration  -repeat BMP mid-week
On chronic renal failure (Creatinine as low as 1.54 in 2/2018 and was 1.93 on admission). Suspect 2/2  prerenal causes. Creatine levels continue to improve  -continue to encourage oral hydration  -repeat BMP mid-week
On chronic renal failure. Suspect 2/2  prerenal causes  - continue to hold lasix   -continue to encourage oral hydration  -Will check another bmp in the am
resolved, overall stable, resumed Lasix - slight bump in Cr post Lasix resumption is expected and has been on Lasix already for a few days. would cw diuretic and have out-pt follow up for routine monitoring

## 2018-06-19 NOTE — PROGRESS NOTE ADULT - PROBLEM SELECTOR PLAN 7
- continue with synthroid
- encourage po hydration with water  - c/w current bowel regimen  -monitor BMs

## 2018-06-19 NOTE — PROGRESS NOTE ADULT - PROBLEM SELECTOR PLAN 3
Patient not in volume overload at this tme  -monitor volume status   - Holding lasix at this time  - Will likely resume when homero resolves and pt w/ stable oral intake   - consider doing every other day dosing
- continue with synthroid
- continue with synthroid
Continue w/ statin
Continue w/ statin
Continue w/ statin  unclear why pt is not ion ASA, would clarify with PMD or cardiologist

## 2018-06-19 NOTE — PROGRESS NOTE ADULT - PROBLEM SELECTOR PROBLEM 5
Hypertension
R/O Atrial fibrillation
Hypertension

## 2018-06-19 NOTE — PROGRESS NOTE ADULT - PROBLEM SELECTOR PROBLEM 6
Constipation, unspecified constipation type
Hypertension
Constipation, unspecified constipation type

## 2018-06-19 NOTE — PROGRESS NOTE ADULT - PROBLEM SELECTOR PLAN 5
- continue to monitor BP  -c/w amlodipine, hydralazine, metoprolol
-c/w amlodipine, hydralazine, metoprolol
For rate control c/w metoprolol   For a/c c/w eliquis
uncontrolled, increase Norvasc to 10 mg daily and c/w hydralazine, metoprolol
uncontrolled, increase Norvasc to 10 mg daily and c/w hydralazine, metoprolol
uncontrolled, increased Norvasc to 10 mg daily   lopressor is ordered wo parameters so pt received, HR is borderline. Goal BP for her age is about 150 or less. some SBP readings in low 160. monitor for now and if uncontrolled will increase Hydralazine
more controlled today - cw current regimen

## 2018-06-19 NOTE — PROGRESS NOTE ADULT - PROBLEM SELECTOR PROBLEM 7
Hypothyroidism, unspecified type
Constipation, unspecified constipation type
Hypothyroidism, unspecified type

## 2018-09-04 PROBLEM — I48.0 PAROXYSMAL ATRIAL FIBRILLATION: Chronic | Status: ACTIVE | Noted: 2018-04-30

## 2018-09-04 PROBLEM — I63.9 CEREBRAL INFARCTION, UNSPECIFIED: Chronic | Status: ACTIVE | Noted: 2018-04-30

## 2018-09-04 PROBLEM — I50.32 CHRONIC DIASTOLIC (CONGESTIVE) HEART FAILURE: Chronic | Status: ACTIVE | Noted: 2018-04-30

## 2018-09-04 PROBLEM — N18.4 CHRONIC KIDNEY DISEASE, STAGE 4 (SEVERE): Chronic | Status: ACTIVE | Noted: 2018-04-30

## 2018-09-24 ENCOUNTER — OUTPATIENT (OUTPATIENT)
Dept: OUTPATIENT SERVICES | Facility: HOSPITAL | Age: 78
LOS: 1 days | Discharge: HOME | End: 2018-09-24

## 2018-09-24 DIAGNOSIS — Z95.1 PRESENCE OF AORTOCORONARY BYPASS GRAFT: Chronic | ICD-10-CM

## 2018-09-25 DIAGNOSIS — F31.32 BIPOLAR DISORDER, CURRENT EPISODE DEPRESSED, MODERATE: ICD-10-CM
